# Patient Record
Sex: FEMALE | Race: WHITE
[De-identification: names, ages, dates, MRNs, and addresses within clinical notes are randomized per-mention and may not be internally consistent; named-entity substitution may affect disease eponyms.]

---

## 2018-02-04 ENCOUNTER — HOSPITAL ENCOUNTER (EMERGENCY)
Age: 78
Discharge: HOME | End: 2018-02-04
Payer: MEDICARE

## 2018-02-04 VITALS
OXYGEN SATURATION: 96 % | HEART RATE: 88 BPM | DIASTOLIC BLOOD PRESSURE: 82 MMHG | SYSTOLIC BLOOD PRESSURE: 162 MMHG | RESPIRATION RATE: 16 BRPM | TEMPERATURE: 98.2 F

## 2018-02-04 VITALS
RESPIRATION RATE: 24 BRPM | HEART RATE: 78 BPM | SYSTOLIC BLOOD PRESSURE: 152 MMHG | DIASTOLIC BLOOD PRESSURE: 71 MMHG | OXYGEN SATURATION: 93 %

## 2018-02-04 VITALS
OXYGEN SATURATION: 96 % | DIASTOLIC BLOOD PRESSURE: 73 MMHG | SYSTOLIC BLOOD PRESSURE: 138 MMHG | HEART RATE: 79 BPM | RESPIRATION RATE: 14 BRPM

## 2018-02-04 VITALS — BODY MASS INDEX: 34.6 KG/M2

## 2018-02-04 DIAGNOSIS — J44.9: ICD-10-CM

## 2018-02-04 DIAGNOSIS — Z72.0: ICD-10-CM

## 2018-02-04 DIAGNOSIS — J11.1: Primary | ICD-10-CM

## 2018-02-04 LAB
ANION GAP: 5 (ref 5–15)
BUN SERPL-MCNC: 12 MG/DL (ref 7–18)
BUN/CREAT RATIO: 20.2 RATIO (ref 10–20)
CALCIUM SERPL-MCNC: 8.7 MG/DL (ref 8.5–10.1)
CARBON DIOXIDE: 28 MMOL/L (ref 21–32)
CHLORIDE: 101 MMOL/L (ref 98–107)
DEPRECATED RDW RBC: 45.6 FL (ref 35.1–43.9)
DIFFERENTIAL INDICATED: (no result)
ERYTHROCYTE [DISTWIDTH] IN BLOOD: 13.5 % (ref 11.6–14.6)
EST GLOM FILT RATE - AFR AMER: 126 ML/MIN (ref 60–?)
ESTIMATED CREATININE CLEARANCE: 36.12 ML/MIN
GLUCOSE: 101 MG/DL (ref 74–106)
HCT VFR BLD AUTO: 37.6 % (ref 37–47)
HEMOGLOBIN: 12.9 G/DL (ref 12–15)
HGB BLD-MCNC: 12.9 G/DL (ref 12–15)
IMMATURE GRANULOCYTES COUNT: 0.03 X10^3/UL (ref 0–0)
MCV RBC: 95.4 FL (ref 81–99)
MEAN CORP HGB CONC: 34.3 G/GL (ref 32–36)
MEAN PLATELET VOL.: 10.8 FL (ref 6.2–12)
PLATELET # BLD: 147 K/MM3 (ref 150–450)
PLATELET COUNT: 147 K/MM3 (ref 150–450)
POSITIVE COUNT: NO
POSITIVE DIFFERENTIAL: NO
POSITIVE MORPHOLOGY: NO
POTASSIUM: 4.2 MMOL/L (ref 3.5–5.1)
RBC # BLD AUTO: 3.94 M/MM3 (ref 4.2–5.4)
RBC DISTRIBUTION WIDTH CV: 13.5 % (ref 11.6–14.6)
RBC DISTRIBUTION WIDTH SD: 45.6 FL (ref 35.1–43.9)
WBC # BLD AUTO: 7.3 K/MM3 (ref 4.4–11)
WHITE BLOOD COUNT: 7.3 K/MM3 (ref 4.4–11)

## 2018-02-04 PROCEDURE — 84484 ASSAY OF TROPONIN QUANT: CPT

## 2018-02-04 PROCEDURE — 71046 X-RAY EXAM CHEST 2 VIEWS: CPT

## 2018-02-04 PROCEDURE — 85025 COMPLETE CBC W/AUTO DIFF WBC: CPT

## 2018-02-04 PROCEDURE — 99284 EMERGENCY DEPT VISIT MOD MDM: CPT

## 2018-02-04 PROCEDURE — 87804 INFLUENZA ASSAY W/OPTIC: CPT

## 2018-02-04 PROCEDURE — 93005 ELECTROCARDIOGRAM TRACING: CPT

## 2018-02-04 PROCEDURE — 80048 BASIC METABOLIC PNL TOTAL CA: CPT

## 2018-02-04 PROCEDURE — A4216 STERILE WATER/SALINE, 10 ML: HCPCS

## 2018-02-19 ENCOUNTER — HOSPITAL ENCOUNTER (OUTPATIENT)
Age: 78
End: 2018-02-19
Payer: MEDICARE

## 2018-02-19 DIAGNOSIS — R05: Primary | ICD-10-CM

## 2018-02-19 PROCEDURE — 71046 X-RAY EXAM CHEST 2 VIEWS: CPT

## 2019-04-29 ENCOUNTER — HOSPITAL ENCOUNTER (OUTPATIENT)
Age: 79
End: 2019-04-29
Payer: MEDICARE

## 2019-04-29 VITALS — BODY MASS INDEX: 34.6 KG/M2

## 2019-04-29 DIAGNOSIS — I10: ICD-10-CM

## 2019-04-29 DIAGNOSIS — I25.10: ICD-10-CM

## 2019-04-29 DIAGNOSIS — M81.0: ICD-10-CM

## 2019-04-29 DIAGNOSIS — R10.9: Primary | ICD-10-CM

## 2019-04-29 LAB
ALANINE AMINOTRANSFER ALT/SGPT: 20 U/L (ref 13–56)
ALBUMIN SERPL-MCNC: 3.3 G/DL (ref 3.2–5)
ALKALINE PHOSPHATASE: 112 U/L (ref 45–117)
ANION GAP: 3 (ref 5–15)
AST(SGOT): 19 U/L (ref 15–37)
BUN SERPL-MCNC: 11 MG/DL (ref 7–18)
BUN/CREAT RATIO: 15.3 RATIO (ref 10–20)
CALCIUM SERPL-MCNC: 8.8 MG/DL (ref 8.5–10.1)
CARBON DIOXIDE: 28 MMOL/L (ref 21–32)
CHLORIDE: 105 MMOL/L (ref 98–107)
CHOLEST SERPL-MCNC: 112 MG/DL
DEPRECATED RDW RBC: 45.9 FL (ref 35.1–43.9)
ERYTHROCYTE [DISTWIDTH] IN BLOOD: 13.8 % (ref 11.6–14.6)
EST GLOM FILT RATE - AFR AMER: 101 ML/MIN (ref 60–?)
GLOBULIN: 3.5 G/DL (ref 2.2–4.2)
GLUCOSE: 98 MG/DL (ref 74–106)
HCT VFR BLD AUTO: 35.7 % (ref 37–47)
HEMOGLOBIN: 11.6 G/DL (ref 12–15)
HGB BLD-MCNC: 11.6 G/DL (ref 12–15)
MAGNESIUM: 1.8 MG/DL (ref 1.6–2.6)
MCV RBC: 92.2 FL (ref 81–99)
MEAN CORP HGB CONC: 32.5 G/GL (ref 32–36)
MEAN PLATELET VOL.: 11.1 FL (ref 6.2–12)
PLATELET # BLD: 287 K/MM3 (ref 150–450)
PLATELET COUNT: 287 K/MM3 (ref 150–450)
POTASSIUM: 4.1 MMOL/L (ref 3.5–5.1)
RBC # BLD AUTO: 3.87 M/MM3 (ref 4.2–5.4)
RBC DISTRIBUTION WIDTH CV: 13.8 % (ref 11.6–14.6)
RBC DISTRIBUTION WIDTH SD: 45.9 FL (ref 35.1–43.9)
SCAN INDICATED ON CBC? Y/N: NO
TRIGLYCERIDES: 115 MG/DL
VITAMIN D,25 HYDROXY: 45.8 NG/ML (ref 29.95–100.01)
VLDLC SERPL-MCNC: 23 MG/DL (ref 5–40)
WBC # BLD AUTO: 9.2 K/MM3 (ref 4.4–11)
WHITE BLOOD COUNT: 9.2 K/MM3 (ref 4.4–11)

## 2019-04-29 PROCEDURE — 80061 LIPID PANEL: CPT

## 2019-04-29 PROCEDURE — 80053 COMPREHEN METABOLIC PANEL: CPT

## 2019-04-29 PROCEDURE — 80048 BASIC METABOLIC PNL TOTAL CA: CPT

## 2019-04-29 PROCEDURE — 82306 VITAMIN D 25 HYDROXY: CPT

## 2019-04-29 PROCEDURE — 83735 ASSAY OF MAGNESIUM: CPT

## 2019-04-29 PROCEDURE — 85027 COMPLETE CBC AUTOMATED: CPT

## 2019-04-29 PROCEDURE — 84443 ASSAY THYROID STIM HORMONE: CPT

## 2019-04-29 PROCEDURE — 36415 COLL VENOUS BLD VENIPUNCTURE: CPT

## 2019-09-12 ENCOUNTER — HOSPITAL ENCOUNTER (OUTPATIENT)
Age: 79
End: 2019-09-12
Payer: MEDICARE

## 2019-09-12 VITALS — BODY MASS INDEX: 34.6 KG/M2

## 2019-09-12 DIAGNOSIS — J06.9: Primary | ICD-10-CM

## 2019-09-12 PROCEDURE — 71046 X-RAY EXAM CHEST 2 VIEWS: CPT

## 2019-09-24 ENCOUNTER — HOSPITAL ENCOUNTER (OUTPATIENT)
Age: 79
End: 2019-09-24
Payer: MEDICARE

## 2019-09-24 VITALS — BODY MASS INDEX: 34.6 KG/M2

## 2019-09-24 DIAGNOSIS — R91.8: Primary | ICD-10-CM

## 2019-09-24 PROCEDURE — 71250 CT THORAX DX C-: CPT

## 2019-09-30 ENCOUNTER — HOSPITAL ENCOUNTER (OUTPATIENT)
Age: 79
End: 2019-09-30
Payer: MEDICARE

## 2019-09-30 VITALS — BODY MASS INDEX: 34.6 KG/M2

## 2019-09-30 DIAGNOSIS — R91.1: Primary | ICD-10-CM

## 2019-09-30 LAB
ALANINE AMINOTRANSFER ALT/SGPT: 19 U/L (ref 13–56)
ALBUMIN SERPL-MCNC: 3.5 G/DL (ref 3.2–5)
ALKALINE PHOSPHATASE: 109 U/L (ref 45–117)
ANION GAP: 6 (ref 5–15)
AST(SGOT): 21 U/L (ref 15–37)
BUN SERPL-MCNC: 16 MG/DL (ref 7–18)
BUN/CREAT RATIO: 26.4 RATIO (ref 10–20)
CALCIUM SERPL-MCNC: 9 MG/DL (ref 8.5–10.1)
CARBON DIOXIDE: 27 MMOL/L (ref 21–32)
CHLORIDE: 103 MMOL/L (ref 98–107)
CRP SERPL-MCNC: < 2.9 MG/L (ref 0–3)
DEPRECATED RDW RBC: 50.4 FL (ref 35.1–43.9)
ERYTHROCYTE [DISTWIDTH] IN BLOOD: 15.5 % (ref 11.6–14.6)
EST GLOM FILT RATE - AFR AMER: 123 ML/MIN (ref 60–?)
GLOBULIN: 3.3 G/DL (ref 2.2–4.2)
GLUCOSE: 92 MG/DL (ref 74–106)
HCT VFR BLD AUTO: 35.3 % (ref 37–47)
HEMOGLOBIN: 11.4 G/DL (ref 12–15)
HGB BLD-MCNC: 11.4 G/DL (ref 12–15)
IMMATURE GRANULOCYTES COUNT: 0.04 X10^3/UL (ref 0–0)
LDH: 165 U/L (ref 84–246)
MCV RBC: 88.7 FL (ref 81–99)
MEAN CORP HGB CONC: 32.3 G/DL (ref 32–36)
MEAN PLATELET VOL.: 10.2 FL (ref 6.2–12)
NRBC FLAGGED BY ANALYZER: 0 % (ref 0–5)
PLATELET # BLD: 297 K/MM3 (ref 150–450)
PLATELET COUNT: 297 K/MM3 (ref 150–450)
POTASSIUM: 4.2 MMOL/L (ref 3.5–5.1)
RBC # BLD AUTO: 3.98 M/MM3 (ref 4.2–5.4)
RBC DISTRIBUTION WIDTH CV: 15.5 % (ref 11.6–14.6)
RBC DISTRIBUTION WIDTH SD: 50.4 FL (ref 35.1–43.9)
WBC # BLD AUTO: 8.4 K/MM3 (ref 4.4–11)
WHITE BLOOD COUNT: 8.4 K/MM3 (ref 4.4–11)

## 2019-09-30 PROCEDURE — 36415 COLL VENOUS BLD VENIPUNCTURE: CPT

## 2019-09-30 PROCEDURE — 83615 LACTATE (LD) (LDH) ENZYME: CPT

## 2019-09-30 PROCEDURE — 85025 COMPLETE CBC W/AUTO DIFF WBC: CPT

## 2019-09-30 PROCEDURE — 85652 RBC SED RATE AUTOMATED: CPT

## 2019-09-30 PROCEDURE — 80053 COMPREHEN METABOLIC PANEL: CPT

## 2019-09-30 PROCEDURE — 86140 C-REACTIVE PROTEIN: CPT

## 2019-10-11 ENCOUNTER — HOSPITAL ENCOUNTER (OUTPATIENT)
Age: 79
End: 2019-10-11
Payer: MEDICARE

## 2019-10-11 VITALS — BODY MASS INDEX: 34.6 KG/M2

## 2019-10-11 DIAGNOSIS — I25.10: ICD-10-CM

## 2019-10-11 DIAGNOSIS — R91.1: Primary | ICD-10-CM

## 2019-10-11 LAB
APTT PPP: 34.5 SECONDS (ref 24.1–36.2)
DEPRECATED RDW RBC: 51.4 FL (ref 35.1–43.9)
ERYTHROCYTE [DISTWIDTH] IN BLOOD: 15.7 % (ref 11.6–14.6)
HCT VFR BLD AUTO: 34.7 % (ref 37–47)
HEMOGLOBIN: 10.9 G/DL (ref 12–15)
HGB BLD-MCNC: 10.9 G/DL (ref 12–15)
MCV RBC: 89.9 FL (ref 81–99)
MEAN CORP HGB CONC: 31.4 G/DL (ref 32–36)
MEAN PLATELET VOL.: 10.5 FL (ref 6.2–12)
PLATELET # BLD: 277 K/MM3 (ref 150–450)
PLATELET COUNT: 277 K/MM3 (ref 150–450)
PROTHROMBIN TIME (PROTIME)PT.: 12.9 SECONDS (ref 11.7–14.9)
RBC # BLD AUTO: 3.86 M/MM3 (ref 4.2–5.4)
RBC DISTRIBUTION WIDTH CV: 15.7 % (ref 11.6–14.6)
RBC DISTRIBUTION WIDTH SD: 51.4 FL (ref 35.1–43.9)
WBC # BLD AUTO: 7.8 K/MM3 (ref 4.4–11)
WHITE BLOOD COUNT: 7.8 K/MM3 (ref 4.4–11)

## 2019-10-11 PROCEDURE — 85730 THROMBOPLASTIN TIME PARTIAL: CPT

## 2019-10-11 PROCEDURE — 36415 COLL VENOUS BLD VENIPUNCTURE: CPT

## 2019-10-11 PROCEDURE — 85610 PROTHROMBIN TIME: CPT

## 2019-10-11 PROCEDURE — 85027 COMPLETE CBC AUTOMATED: CPT

## 2019-10-15 ENCOUNTER — HOSPITAL ENCOUNTER (OUTPATIENT)
Dept: HOSPITAL 100 - CT | Age: 79
End: 2019-10-15
Payer: MEDICARE

## 2019-10-15 VITALS
HEART RATE: 76 BPM | OXYGEN SATURATION: 97 % | DIASTOLIC BLOOD PRESSURE: 83 MMHG | RESPIRATION RATE: 16 BRPM | SYSTOLIC BLOOD PRESSURE: 171 MMHG

## 2019-10-15 VITALS
HEART RATE: 78 BPM | OXYGEN SATURATION: 99 % | TEMPERATURE: 98.6 F | RESPIRATION RATE: 18 BRPM | DIASTOLIC BLOOD PRESSURE: 61 MMHG | SYSTOLIC BLOOD PRESSURE: 157 MMHG

## 2019-10-15 VITALS
HEART RATE: 84 BPM | OXYGEN SATURATION: 98 % | DIASTOLIC BLOOD PRESSURE: 94 MMHG | RESPIRATION RATE: 18 BRPM | SYSTOLIC BLOOD PRESSURE: 193 MMHG

## 2019-10-15 VITALS
SYSTOLIC BLOOD PRESSURE: 116 MMHG | DIASTOLIC BLOOD PRESSURE: 76 MMHG | RESPIRATION RATE: 14 BRPM | HEART RATE: 80 BPM | OXYGEN SATURATION: 97 %

## 2019-10-15 VITALS
HEART RATE: 82 BPM | RESPIRATION RATE: 16 BRPM | DIASTOLIC BLOOD PRESSURE: 95 MMHG | SYSTOLIC BLOOD PRESSURE: 184 MMHG | OXYGEN SATURATION: 97 %

## 2019-10-15 VITALS
OXYGEN SATURATION: 97 % | RESPIRATION RATE: 14 BRPM | SYSTOLIC BLOOD PRESSURE: 184 MMHG | DIASTOLIC BLOOD PRESSURE: 95 MMHG | HEART RATE: 84 BPM

## 2019-10-15 VITALS
OXYGEN SATURATION: 97 % | DIASTOLIC BLOOD PRESSURE: 87 MMHG | HEART RATE: 75 BPM | SYSTOLIC BLOOD PRESSURE: 150 MMHG | RESPIRATION RATE: 14 BRPM

## 2019-10-15 VITALS
HEART RATE: 85 BPM | SYSTOLIC BLOOD PRESSURE: 119 MMHG | DIASTOLIC BLOOD PRESSURE: 43 MMHG | RESPIRATION RATE: 16 BRPM | OXYGEN SATURATION: 96 %

## 2019-10-15 VITALS
HEART RATE: 80 BPM | DIASTOLIC BLOOD PRESSURE: 105 MMHG | RESPIRATION RATE: 17 BRPM | SYSTOLIC BLOOD PRESSURE: 169 MMHG | OXYGEN SATURATION: 98 %

## 2019-10-15 VITALS
SYSTOLIC BLOOD PRESSURE: 169 MMHG | HEART RATE: 75 BPM | DIASTOLIC BLOOD PRESSURE: 60 MMHG | OXYGEN SATURATION: 95 % | RESPIRATION RATE: 20 BRPM

## 2019-10-15 VITALS
DIASTOLIC BLOOD PRESSURE: 61 MMHG | RESPIRATION RATE: 20 BRPM | OXYGEN SATURATION: 96 % | HEART RATE: 72 BPM | SYSTOLIC BLOOD PRESSURE: 160 MMHG

## 2019-10-15 VITALS — BODY MASS INDEX: 34.6 KG/M2 | BODY MASS INDEX: 33.8 KG/M2

## 2019-10-15 DIAGNOSIS — I25.10: ICD-10-CM

## 2019-10-15 DIAGNOSIS — E78.5: ICD-10-CM

## 2019-10-15 DIAGNOSIS — C34.12: Primary | ICD-10-CM

## 2019-10-15 DIAGNOSIS — F32.9: ICD-10-CM

## 2019-10-15 DIAGNOSIS — F17.210: ICD-10-CM

## 2019-10-15 DIAGNOSIS — R91.1: ICD-10-CM

## 2019-10-15 DIAGNOSIS — I10: ICD-10-CM

## 2019-10-15 PROCEDURE — 71046 X-RAY EXAM CHEST 2 VIEWS: CPT

## 2019-10-15 PROCEDURE — 88342 IMHCHEM/IMCYTCHM 1ST ANTB: CPT

## 2019-10-15 PROCEDURE — 99156 MOD SED OTH PHYS/QHP 5/>YRS: CPT

## 2019-10-15 PROCEDURE — 32405: CPT

## 2019-10-15 PROCEDURE — 88341 IMHCHEM/IMCYTCHM EA ADD ANTB: CPT

## 2019-10-15 PROCEDURE — 77012 CT SCAN FOR NEEDLE BIOPSY: CPT

## 2019-10-15 PROCEDURE — 88172 CYTP DX EVAL FNA 1ST EA SITE: CPT

## 2019-10-15 PROCEDURE — A4216 STERILE WATER/SALINE, 10 ML: HCPCS

## 2019-10-15 PROCEDURE — 88305 TISSUE EXAM BY PATHOLOGIST: CPT

## 2019-10-15 PROCEDURE — 99157 MOD SED OTHER PHYS/QHP EA: CPT

## 2019-10-15 PROCEDURE — 88313 SPECIAL STAINS GROUP 2: CPT

## 2019-10-15 RX ADMIN — FENTANYL CITRATE 0 MCG: 50 INJECTION, SOLUTION INTRAMUSCULAR; INTRAVENOUS at 11:00

## 2019-10-15 RX ADMIN — MIDAZOLAM 0 MG: 1 INJECTION, SOLUTION INTRAMUSCULAR; INTRAVENOUS at 11:00

## 2019-10-21 ENCOUNTER — HOSPITAL ENCOUNTER (OUTPATIENT)
Dept: HOSPITAL 100 - CT | Age: 79
End: 2019-10-21
Payer: MEDICARE

## 2019-10-21 VITALS — BODY MASS INDEX: 33.8 KG/M2

## 2019-10-21 DIAGNOSIS — C34.90: Primary | ICD-10-CM

## 2019-10-21 PROCEDURE — 74160 CT ABDOMEN W/CONTRAST: CPT

## 2019-11-12 ENCOUNTER — HOSPITAL ENCOUNTER (INPATIENT)
Dept: HOSPITAL 100 - ED | Age: 79
LOS: 1 days | Discharge: ANOTHER HOSPITAL | DRG: 64 | End: 2019-11-13
Payer: MEDICARE

## 2019-11-12 VITALS
RESPIRATION RATE: 20 BRPM | DIASTOLIC BLOOD PRESSURE: 67 MMHG | HEART RATE: 63 BPM | OXYGEN SATURATION: 95 % | TEMPERATURE: 97.34 F | SYSTOLIC BLOOD PRESSURE: 132 MMHG

## 2019-11-12 VITALS
HEART RATE: 83 BPM | DIASTOLIC BLOOD PRESSURE: 81 MMHG | SYSTOLIC BLOOD PRESSURE: 161 MMHG | RESPIRATION RATE: 15 BRPM | OXYGEN SATURATION: 94 % | TEMPERATURE: 97.52 F

## 2019-11-12 VITALS — TEMPERATURE: 97.88 F

## 2019-11-12 VITALS
OXYGEN SATURATION: 95 % | DIASTOLIC BLOOD PRESSURE: 60 MMHG | RESPIRATION RATE: 16 BRPM | HEART RATE: 92 BPM | SYSTOLIC BLOOD PRESSURE: 148 MMHG | TEMPERATURE: 97.8 F

## 2019-11-12 VITALS
RESPIRATION RATE: 16 BRPM | HEART RATE: 79 BPM | OXYGEN SATURATION: 95 % | SYSTOLIC BLOOD PRESSURE: 142 MMHG | DIASTOLIC BLOOD PRESSURE: 71 MMHG | TEMPERATURE: 98.42 F

## 2019-11-12 VITALS
OXYGEN SATURATION: 94 % | HEART RATE: 75 BPM | SYSTOLIC BLOOD PRESSURE: 139 MMHG | TEMPERATURE: 98.5 F | RESPIRATION RATE: 16 BRPM | DIASTOLIC BLOOD PRESSURE: 68 MMHG

## 2019-11-12 VITALS — BODY MASS INDEX: 33.7 KG/M2 | BODY MASS INDEX: 33 KG/M2 | BODY MASS INDEX: 35.4 KG/M2

## 2019-11-12 VITALS — HEART RATE: 75 BPM

## 2019-11-12 VITALS — HEART RATE: 92 BPM

## 2019-11-12 VITALS — OXYGEN SATURATION: 93 %

## 2019-11-12 VITALS — HEART RATE: 80 BPM

## 2019-11-12 DIAGNOSIS — Z79.82: ICD-10-CM

## 2019-11-12 DIAGNOSIS — E78.5: ICD-10-CM

## 2019-11-12 DIAGNOSIS — F32.9: ICD-10-CM

## 2019-11-12 DIAGNOSIS — I10: ICD-10-CM

## 2019-11-12 DIAGNOSIS — Z82.49: ICD-10-CM

## 2019-11-12 DIAGNOSIS — D64.9: ICD-10-CM

## 2019-11-12 DIAGNOSIS — I25.10: ICD-10-CM

## 2019-11-12 DIAGNOSIS — Z79.899: ICD-10-CM

## 2019-11-12 DIAGNOSIS — C34.12: ICD-10-CM

## 2019-11-12 DIAGNOSIS — I65.23: ICD-10-CM

## 2019-11-12 DIAGNOSIS — Z80.1: ICD-10-CM

## 2019-11-12 DIAGNOSIS — K27.4: ICD-10-CM

## 2019-11-12 DIAGNOSIS — F17.210: ICD-10-CM

## 2019-11-12 DIAGNOSIS — I63.9: Primary | ICD-10-CM

## 2019-11-12 DIAGNOSIS — K21.9: ICD-10-CM

## 2019-11-12 LAB
ANION GAP: 9 (ref 5–15)
APTT PPP: 28.3 SECONDS (ref 24.1–36.2)
BUN SERPL-MCNC: 13 MG/DL (ref 7–18)
BUN/CREAT RATIO: 22.3 RATIO (ref 10–20)
CALCIUM SERPL-MCNC: 8.9 MG/DL (ref 8.5–10.1)
CARBON DIOXIDE: 24 MMOL/L (ref 21–32)
CHLORIDE: 102 MMOL/L (ref 98–107)
DEPRECATED RDW RBC: 50.6 FL (ref 35.1–43.9)
ERYTHROCYTE [DISTWIDTH] IN BLOOD: 15.4 % (ref 11.6–14.6)
EST GLOM FILT RATE - AFR AMER: 129 ML/MIN (ref 60–?)
ESTIMATED CREATININE CLEARANCE: 36.67 ML/MIN
GLUCOSE: 153 MG/DL (ref 74–106)
HCT VFR BLD AUTO: 28.1 % (ref 37–47)
HEMOGLOBIN: 8.8 G/DL (ref 12–15)
HGB BLD-MCNC: 8.8 G/DL (ref 12–15)
IMMATURE GRANULOCYTES COUNT: 0.06 X10^3/UL (ref 0–0)
MCV RBC: 88.6 FL (ref 81–99)
MEAN CORP HGB CONC: 31.3 G/DL (ref 32–36)
MEAN PLATELET VOL.: 9.6 FL (ref 6.2–12)
NRBC FLAGGED BY ANALYZER: 0 % (ref 0–5)
PLATELET # BLD: 297 K/MM3 (ref 150–450)
PLATELET COUNT: 297 K/MM3 (ref 150–450)
POTASSIUM: 3.7 MMOL/L (ref 3.5–5.1)
PROTHROMBIN TIME (PROTIME)PT.: 13.4 SECONDS (ref 11.7–14.9)
RBC # BLD AUTO: 3.17 M/MM3 (ref 4.2–5.4)
RBC DISTRIBUTION WIDTH CV: 15.4 % (ref 11.6–14.6)
RBC DISTRIBUTION WIDTH SD: 50.6 FL (ref 35.1–43.9)
WBC # BLD AUTO: 10.8 K/MM3 (ref 4.4–11)
WHITE BLOOD COUNT: 10.8 K/MM3 (ref 4.4–11)

## 2019-11-12 PROCEDURE — 80048 BASIC METABOLIC PNL TOTAL CA: CPT

## 2019-11-12 PROCEDURE — 82728 ASSAY OF FERRITIN: CPT

## 2019-11-12 PROCEDURE — 83036 HEMOGLOBIN GLYCOSYLATED A1C: CPT

## 2019-11-12 PROCEDURE — 82607 VITAMIN B-12: CPT

## 2019-11-12 PROCEDURE — 93005 ELECTROCARDIOGRAM TRACING: CPT

## 2019-11-12 PROCEDURE — A9575 INJ GADOTERATE MEGLUMI 0.1ML: HCPCS

## 2019-11-12 PROCEDURE — A4216 STERILE WATER/SALINE, 10 ML: HCPCS

## 2019-11-12 PROCEDURE — 85025 COMPLETE CBC W/AUTO DIFF WBC: CPT

## 2019-11-12 PROCEDURE — 85730 THROMBOPLASTIN TIME PARTIAL: CPT

## 2019-11-12 PROCEDURE — 84484 ASSAY OF TROPONIN QUANT: CPT

## 2019-11-12 PROCEDURE — 85610 PROTHROMBIN TIME: CPT

## 2019-11-12 PROCEDURE — 99406 BEHAV CHNG SMOKING 3-10 MIN: CPT

## 2019-11-12 PROCEDURE — 93880 EXTRACRANIAL BILAT STUDY: CPT

## 2019-11-12 PROCEDURE — 70553 MRI BRAIN STEM W/O & W/DYE: CPT

## 2019-11-12 PROCEDURE — 70498 CT ANGIOGRAPHY NECK: CPT

## 2019-11-12 PROCEDURE — 80053 COMPREHEN METABOLIC PANEL: CPT

## 2019-11-12 PROCEDURE — 70450 CT HEAD/BRAIN W/O DYE: CPT

## 2019-11-12 PROCEDURE — 36415 COLL VENOUS BLD VENIPUNCTURE: CPT

## 2019-11-12 PROCEDURE — 93306 TTE W/DOPPLER COMPLETE: CPT

## 2019-11-12 PROCEDURE — 70496 CT ANGIOGRAPHY HEAD: CPT

## 2019-11-12 PROCEDURE — 83615 LACTATE (LD) (LDH) ENZYME: CPT

## 2019-11-12 PROCEDURE — 82746 ASSAY OF FOLIC ACID SERUM: CPT

## 2019-11-12 PROCEDURE — 99285 EMERGENCY DEPT VISIT HI MDM: CPT

## 2019-11-12 PROCEDURE — 71045 X-RAY EXAM CHEST 1 VIEW: CPT

## 2019-11-12 PROCEDURE — 83540 ASSAY OF IRON: CPT

## 2019-11-12 PROCEDURE — 83550 IRON BINDING TEST: CPT

## 2019-11-12 RX ADMIN — SODIUM CHLORIDE, PRESERVATIVE FREE 0 ML: 5 INJECTION INTRAVENOUS at 22:18

## 2019-11-13 VITALS
RESPIRATION RATE: 18 BRPM | HEART RATE: 73 BPM | TEMPERATURE: 98.42 F | OXYGEN SATURATION: 96 % | SYSTOLIC BLOOD PRESSURE: 117 MMHG | DIASTOLIC BLOOD PRESSURE: 63 MMHG

## 2019-11-13 VITALS
DIASTOLIC BLOOD PRESSURE: 69 MMHG | RESPIRATION RATE: 17 BRPM | HEART RATE: 69 BPM | OXYGEN SATURATION: 96 % | SYSTOLIC BLOOD PRESSURE: 173 MMHG | TEMPERATURE: 98.24 F

## 2019-11-13 VITALS
OXYGEN SATURATION: 94 % | RESPIRATION RATE: 16 BRPM | DIASTOLIC BLOOD PRESSURE: 52 MMHG | TEMPERATURE: 98.06 F | SYSTOLIC BLOOD PRESSURE: 124 MMHG | HEART RATE: 68 BPM

## 2019-11-13 VITALS
DIASTOLIC BLOOD PRESSURE: 62 MMHG | OXYGEN SATURATION: 93 % | HEART RATE: 71 BPM | TEMPERATURE: 98.5 F | RESPIRATION RATE: 17 BRPM | SYSTOLIC BLOOD PRESSURE: 139 MMHG

## 2019-11-13 VITALS — HEART RATE: 81 BPM

## 2019-11-13 VITALS — HEART RATE: 82 BPM

## 2019-11-13 VITALS — HEART RATE: 72 BPM

## 2019-11-13 VITALS — HEART RATE: 67 BPM

## 2019-11-13 RX ADMIN — SODIUM CHLORIDE, PRESERVATIVE FREE 0 ML: 5 INJECTION INTRAVENOUS at 09:14

## 2020-02-10 ENCOUNTER — HOSPITAL ENCOUNTER (OUTPATIENT)
Dept: HOSPITAL 100 - CT | Age: 80
End: 2020-02-10
Payer: MEDICARE

## 2020-02-10 VITALS — BODY MASS INDEX: 33.7 KG/M2 | BODY MASS INDEX: 33.1 KG/M2

## 2020-02-10 DIAGNOSIS — C34.90: Primary | ICD-10-CM

## 2020-02-10 LAB — CREATININE FINGERSTICK: 0.7 MG/DL (ref 0.55–1.02)

## 2020-02-10 PROCEDURE — 71260 CT THORAX DX C+: CPT

## 2020-02-10 PROCEDURE — 74160 CT ABDOMEN W/CONTRAST: CPT

## 2020-05-13 ENCOUNTER — HOSPITAL ENCOUNTER (OUTPATIENT)
Age: 80
End: 2020-05-13
Payer: MEDICARE

## 2020-05-13 VITALS — BODY MASS INDEX: 33.1 KG/M2 | BODY MASS INDEX: 33.7 KG/M2

## 2020-05-13 DIAGNOSIS — K92.2: Primary | ICD-10-CM

## 2020-05-13 DIAGNOSIS — M81.0: ICD-10-CM

## 2020-05-13 LAB
ALANINE AMINOTRANSFER ALT/SGPT: 23 U/L (ref 13–56)
ALBUMIN SERPL-MCNC: 3.2 G/DL (ref 3.2–5)
ALKALINE PHOSPHATASE: 118 U/L (ref 45–117)
ANION GAP: 7 (ref 5–15)
AST(SGOT): 19 U/L (ref 15–37)
BUN SERPL-MCNC: 23 MG/DL (ref 7–18)
BUN/CREAT RATIO: 40.2 RATIO (ref 10–20)
CALCIUM SERPL-MCNC: 9 MG/DL (ref 8.5–10.1)
CARBON DIOXIDE: 27 MMOL/L (ref 21–32)
CHLORIDE: 105 MMOL/L (ref 98–107)
DEPRECATED RDW RBC: 57.2 FL (ref 35.1–43.9)
ERYTHROCYTE [DISTWIDTH] IN BLOOD: 18.7 % (ref 11.6–14.6)
EST GLOM FILT RATE - AFR AMER: 131 ML/MIN (ref 60–?)
FERRITIN SERPL-MCNC: 5 NG/ML (ref 8–252)
GLOBULIN: 3.5 G/DL (ref 2.2–4.2)
GLUCOSE: 91 MG/DL (ref 74–106)
HCT VFR BLD AUTO: 33 % (ref 37–47)
HEMOGLOBIN: 9.9 G/DL (ref 12–15)
HGB BLD-MCNC: 9.9 G/DL (ref 12–15)
IMMATURE GRANULOCYTES COUNT: 0.03 X10^3/UL (ref 0–0)
IMMATURE RETICULOCYTE FRACTION: 20 % (ref 3–15.9)
IRON SPEC-MCNT: 19 UG/DL (ref 50–170)
MCV RBC: 83.5 FL (ref 81–99)
MEAN CORP HGB CONC: 30 G/DL (ref 32–36)
MEAN PLATELET VOL.: 10.7 FL (ref 6.2–12)
NRBC FLAGGED BY ANALYZER: 0 % (ref 0–5)
PLATELET # BLD: 274 K/MM3 (ref 150–450)
PLATELET COUNT: 274 K/MM3 (ref 150–450)
POTASSIUM: 3.9 MMOL/L (ref 3.5–5.1)
RBC # BLD AUTO: 3.95 M/MM3 (ref 4.2–5.4)
RBC DISTRIBUTION WIDTH CV: 18.7 % (ref 11.6–14.6)
RBC DISTRIBUTION WIDTH SD: 57.2 FL (ref 35.1–43.9)
RETICS # AUTO: 1.33 % (ref 0.5–1.5)
VITAMIN D,25 HYDROXY: 43.8 NG/ML
WBC # BLD AUTO: 7.6 K/MM3 (ref 4.4–11)
WHITE BLOOD COUNT: 7.6 K/MM3 (ref 4.4–11)

## 2020-05-13 PROCEDURE — 82728 ASSAY OF FERRITIN: CPT

## 2020-05-13 PROCEDURE — 83540 ASSAY OF IRON: CPT

## 2020-05-13 PROCEDURE — 85045 AUTOMATED RETICULOCYTE COUNT: CPT

## 2020-05-13 PROCEDURE — 36415 COLL VENOUS BLD VENIPUNCTURE: CPT

## 2020-05-13 PROCEDURE — 80053 COMPREHEN METABOLIC PANEL: CPT

## 2020-05-13 PROCEDURE — 85025 COMPLETE CBC W/AUTO DIFF WBC: CPT

## 2020-05-13 PROCEDURE — 84443 ASSAY THYROID STIM HORMONE: CPT

## 2020-05-13 PROCEDURE — 82306 VITAMIN D 25 HYDROXY: CPT

## 2020-05-27 ENCOUNTER — HOSPITAL ENCOUNTER (OUTPATIENT)
Age: 80
End: 2020-05-27
Payer: MEDICARE

## 2020-05-27 VITALS — BODY MASS INDEX: 33.7 KG/M2 | BODY MASS INDEX: 33.1 KG/M2

## 2020-05-27 DIAGNOSIS — R10.10: Primary | ICD-10-CM

## 2020-05-27 PROCEDURE — 74019 RADEX ABDOMEN 2 VIEWS: CPT

## 2020-06-24 ENCOUNTER — HOSPITAL ENCOUNTER (OUTPATIENT)
Dept: HOSPITAL 100 - CT | Age: 80
End: 2020-06-24
Payer: MEDICARE

## 2020-06-24 VITALS — BODY MASS INDEX: 33.7 KG/M2 | BODY MASS INDEX: 33.1 KG/M2

## 2020-06-24 DIAGNOSIS — C34.92: Primary | ICD-10-CM

## 2020-06-24 LAB — CREATININE FINGERSTICK: < 0.6 MG/DL (ref 0.55–1.02)

## 2020-06-24 PROCEDURE — 71260 CT THORAX DX C+: CPT

## 2020-10-29 ENCOUNTER — HOSPITAL ENCOUNTER (OUTPATIENT)
Age: 80
End: 2020-10-29
Payer: MEDICARE

## 2020-10-29 VITALS — BODY MASS INDEX: 33.7 KG/M2 | BODY MASS INDEX: 33.1 KG/M2

## 2020-10-29 DIAGNOSIS — F17.210: ICD-10-CM

## 2020-10-29 DIAGNOSIS — C34.92: Primary | ICD-10-CM

## 2020-10-29 DIAGNOSIS — I10: ICD-10-CM

## 2020-10-29 PROCEDURE — 71250 CT THORAX DX C-: CPT

## 2021-02-03 ENCOUNTER — HOSPITAL ENCOUNTER (OUTPATIENT)
Age: 81
End: 2021-02-03
Payer: MEDICARE

## 2021-02-03 VITALS — BODY MASS INDEX: 33.1 KG/M2 | BODY MASS INDEX: 33.7 KG/M2

## 2021-02-03 DIAGNOSIS — R07.9: Primary | ICD-10-CM

## 2021-02-03 LAB
ALANINE AMINOTRANSFER ALT/SGPT: 19 U/L (ref 13–56)
ALBUMIN SERPL-MCNC: 3.6 G/DL (ref 3.2–5)
ALKALINE PHOSPHATASE: 107 U/L (ref 45–117)
ANION GAP: 4 (ref 5–15)
AST(SGOT): 17 U/L (ref 15–37)
BUN SERPL-MCNC: 21 MG/DL (ref 7–18)
BUN/CREAT RATIO: 32.8 RATIO (ref 10–20)
CALCIUM SERPL-MCNC: 9.2 MG/DL (ref 8.5–10.1)
CARBON DIOXIDE: 31 MMOL/L (ref 21–32)
CHLORIDE: 102 MMOL/L (ref 98–107)
CHOLEST SERPL-MCNC: 139 MG/DL
DEPRECATED RDW RBC: 48.2 FL (ref 35.1–43.9)
ERYTHROCYTE [DISTWIDTH] IN BLOOD: 13.3 % (ref 11.6–14.6)
EST GLOM FILT RATE - AFR AMER: 115 ML/MIN (ref 60–?)
GLOBULIN: 3.7 G/DL (ref 2.2–4.2)
GLUCOSE: 92 MG/DL (ref 74–106)
HCT VFR BLD AUTO: 44.6 % (ref 37–47)
HEMOGLOBIN: 13.8 G/DL (ref 12–15)
HGB BLD-MCNC: 13.8 G/DL (ref 12–15)
MCV RBC: 97.8 FL (ref 81–99)
MEAN CORP HGB CONC: 30.9 G/DL (ref 32–36)
MEAN PLATELET VOL.: 10.6 FL (ref 6.2–12)
PLATELET # BLD: 239 K/MM3 (ref 150–450)
PLATELET COUNT: 239 K/MM3 (ref 150–450)
POTASSIUM: 3.7 MMOL/L (ref 3.5–5.1)
RBC # BLD AUTO: 4.56 M/MM3 (ref 4.2–5.4)
RBC DISTRIBUTION WIDTH CV: 13.3 % (ref 11.6–14.6)
RBC DISTRIBUTION WIDTH SD: 48.2 FL (ref 35.1–43.9)
TRIGLYCERIDES: 95 MG/DL
VLDLC SERPL-MCNC: 19 MG/DL (ref 5–40)
WBC # BLD AUTO: 7.2 K/MM3 (ref 4.4–11)
WHITE BLOOD COUNT: 7.2 K/MM3 (ref 4.4–11)

## 2021-02-03 PROCEDURE — 85027 COMPLETE CBC AUTOMATED: CPT

## 2021-02-03 PROCEDURE — 84443 ASSAY THYROID STIM HORMONE: CPT

## 2021-02-03 PROCEDURE — 71046 X-RAY EXAM CHEST 2 VIEWS: CPT

## 2021-02-03 PROCEDURE — 80061 LIPID PANEL: CPT

## 2021-02-03 PROCEDURE — 36415 COLL VENOUS BLD VENIPUNCTURE: CPT

## 2021-02-03 PROCEDURE — 80053 COMPREHEN METABOLIC PANEL: CPT

## 2021-03-25 ENCOUNTER — HOSPITAL ENCOUNTER (OUTPATIENT)
Age: 81
End: 2021-03-25
Payer: MEDICARE

## 2021-03-25 VITALS — BODY MASS INDEX: 33.1 KG/M2 | BODY MASS INDEX: 33.7 KG/M2

## 2021-03-25 DIAGNOSIS — C34.92: Primary | ICD-10-CM

## 2021-03-25 PROCEDURE — 71250 CT THORAX DX C-: CPT

## 2021-05-05 ENCOUNTER — HOSPITAL ENCOUNTER (OUTPATIENT)
Age: 81
End: 2021-05-05
Payer: MEDICARE

## 2021-05-05 VITALS — BODY MASS INDEX: 33.7 KG/M2 | BODY MASS INDEX: 33.1 KG/M2

## 2021-05-05 DIAGNOSIS — I10: Primary | ICD-10-CM

## 2021-05-05 DIAGNOSIS — E55.9: ICD-10-CM

## 2021-05-05 LAB
ANION GAP: 5 (ref 5–15)
BUN SERPL-MCNC: 21 MG/DL (ref 7–18)
BUN/CREAT RATIO: 36.7 RATIO (ref 10–20)
CALCIUM SERPL-MCNC: 9 MG/DL (ref 8.5–10.1)
CARBON DIOXIDE: 33 MMOL/L (ref 21–32)
CHLORIDE: 97 MMOL/L (ref 98–107)
EST GLOM FILT RATE - AFR AMER: 131 ML/MIN (ref 60–?)
GLUCOSE: 95 MG/DL (ref 74–106)
POTASSIUM: 3.6 MMOL/L (ref 3.5–5.1)
VITAMIN D,25 HYDROXY: 48.4 NG/ML

## 2021-05-05 PROCEDURE — 82306 VITAMIN D 25 HYDROXY: CPT

## 2021-05-05 PROCEDURE — 36415 COLL VENOUS BLD VENIPUNCTURE: CPT

## 2021-05-05 PROCEDURE — 80048 BASIC METABOLIC PNL TOTAL CA: CPT

## 2021-06-16 ENCOUNTER — HOSPITAL ENCOUNTER (OUTPATIENT)
Age: 81
End: 2021-06-16
Payer: MEDICARE

## 2021-06-16 VITALS — BODY MASS INDEX: 33.7 KG/M2 | BODY MASS INDEX: 33.1 KG/M2

## 2021-06-16 DIAGNOSIS — M54.9: Primary | ICD-10-CM

## 2021-06-16 PROCEDURE — 72070 X-RAY EXAM THORAC SPINE 2VWS: CPT

## 2021-06-16 PROCEDURE — 72110 X-RAY EXAM L-2 SPINE 4/>VWS: CPT

## 2021-06-25 ENCOUNTER — HOSPITAL ENCOUNTER (EMERGENCY)
Age: 81
Discharge: HOME | End: 2021-06-25
Payer: MEDICARE

## 2021-06-25 VITALS
RESPIRATION RATE: 15 BRPM | SYSTOLIC BLOOD PRESSURE: 154 MMHG | OXYGEN SATURATION: 97 % | HEART RATE: 62 BPM | DIASTOLIC BLOOD PRESSURE: 75 MMHG | TEMPERATURE: 97.16 F

## 2021-06-25 VITALS — BODY MASS INDEX: 31.1 KG/M2 | BODY MASS INDEX: 33.7 KG/M2 | BODY MASS INDEX: 33.1 KG/M2
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
## 2021-06-25 DIAGNOSIS — I10: ICD-10-CM

## 2021-06-25 DIAGNOSIS — I25.10: ICD-10-CM

## 2021-06-25 DIAGNOSIS — Z79.82: ICD-10-CM

## 2021-06-25 DIAGNOSIS — E78.5: ICD-10-CM

## 2021-06-25 DIAGNOSIS — F17.210: ICD-10-CM
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
## 2021-06-25 DIAGNOSIS — S93.401A: Primary | ICD-10-CM

## 2021-06-25 DIAGNOSIS — F32.9: ICD-10-CM

## 2021-06-25 DIAGNOSIS — Z79.899: ICD-10-CM
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
## 2021-06-25 PROCEDURE — 73610 X-RAY EXAM OF ANKLE: CPT

## 2021-06-25 PROCEDURE — 99282 EMERGENCY DEPT VISIT SF MDM: CPT

## 2021-06-25 PROCEDURE — A4216 STERILE WATER/SALINE, 10 ML: HCPCS

## 2021-08-19 ENCOUNTER — HOSPITAL ENCOUNTER (OUTPATIENT)
Dept: HOSPITAL 100 - MTRAD | Age: 81
End: 2021-08-19
Payer: MEDICARE

## 2021-08-19 VITALS — BODY MASS INDEX: 33.1 KG/M2

## 2021-08-19 DIAGNOSIS — R07.81: Primary | ICD-10-CM

## 2021-08-19 PROCEDURE — 71100 X-RAY EXAM RIBS UNI 2 VIEWS: CPT

## 2021-09-15 ENCOUNTER — HOSPITAL ENCOUNTER (OUTPATIENT)
Dept: HOSPITAL 100 - CT | Age: 81
End: 2021-09-15
Payer: MEDICARE

## 2021-09-15 VITALS — BODY MASS INDEX: 33.7 KG/M2 | BODY MASS INDEX: 33.1 KG/M2

## 2021-09-15 DIAGNOSIS — Z85.118: Primary | ICD-10-CM

## 2021-09-15 PROCEDURE — 71250 CT THORAX DX C-: CPT

## 2021-10-01 ENCOUNTER — HOSPITAL ENCOUNTER (EMERGENCY)
Age: 81
Discharge: HOME | End: 2021-10-01
Payer: MEDICARE

## 2021-10-01 VITALS — BODY MASS INDEX: 33.1 KG/M2 | BODY MASS INDEX: 34.7 KG/M2

## 2021-10-01 VITALS
TEMPERATURE: 96.98 F | RESPIRATION RATE: 16 BRPM | DIASTOLIC BLOOD PRESSURE: 68 MMHG | SYSTOLIC BLOOD PRESSURE: 141 MMHG | HEART RATE: 67 BPM | OXYGEN SATURATION: 97 %

## 2021-10-01 VITALS — DIASTOLIC BLOOD PRESSURE: 81 MMHG | RESPIRATION RATE: 16 BRPM | HEART RATE: 77 BPM | SYSTOLIC BLOOD PRESSURE: 151 MMHG

## 2021-10-01 DIAGNOSIS — F32.9: ICD-10-CM

## 2021-10-01 DIAGNOSIS — I25.10: ICD-10-CM

## 2021-10-01 DIAGNOSIS — M71.9: Primary | ICD-10-CM

## 2021-10-01 DIAGNOSIS — Z79.82: ICD-10-CM

## 2021-10-01 DIAGNOSIS — I10: ICD-10-CM

## 2021-10-01 DIAGNOSIS — F17.210: ICD-10-CM

## 2021-10-01 DIAGNOSIS — E78.5: ICD-10-CM

## 2021-10-01 DIAGNOSIS — Z79.52: ICD-10-CM

## 2021-10-01 PROCEDURE — 73700 CT LOWER EXTREMITY W/O DYE: CPT

## 2021-10-01 PROCEDURE — 73502 X-RAY EXAM HIP UNI 2-3 VIEWS: CPT

## 2021-10-01 PROCEDURE — 99283 EMERGENCY DEPT VISIT LOW MDM: CPT

## 2021-11-11 ENCOUNTER — HOSPITAL ENCOUNTER (OUTPATIENT)
Age: 81
End: 2021-11-11
Payer: MEDICARE

## 2021-11-11 VITALS — BODY MASS INDEX: 33.1 KG/M2

## 2021-11-11 DIAGNOSIS — R10.9: Primary | ICD-10-CM

## 2021-11-11 LAB
ALANINE AMINOTRANSFER ALT/SGPT: 14 U/L (ref 13–56)
ALBUMIN SERPL-MCNC: 2.7 G/DL (ref 3.2–5)
ALKALINE PHOSPHATASE: 188 U/L (ref 45–117)
ANION GAP: 7 (ref 5–15)
AST(SGOT): 15 U/L (ref 15–37)
BUN SERPL-MCNC: 29 MG/DL (ref 7–18)
BUN/CREAT RATIO: 26.4 RATIO (ref 10–20)
CALCIUM SERPL-MCNC: 8.5 MG/DL (ref 8.5–10.1)
CARBON DIOXIDE: 31 MMOL/L (ref 21–32)
CHLORIDE: 97 MMOL/L (ref 98–107)
DEPRECATED RDW RBC: 57.1 FL (ref 35.1–43.9)
ERYTHROCYTE [DISTWIDTH] IN BLOOD: 15.9 % (ref 11.6–14.6)
EST GLOM FILT RATE - AFR AMER: 62 ML/MIN (ref 60–?)
GLOBULIN: 3.8 G/DL (ref 2.2–4.2)
GLUCOSE: 99 MG/DL (ref 74–106)
HCT VFR BLD AUTO: 36.4 % (ref 37–47)
HEMOGLOBIN: 12.1 G/DL (ref 12–15)
HGB BLD-MCNC: 12.1 G/DL (ref 12–15)
IMMATURE GRANULOCYTES COUNT: 0.08 X10^3/UL (ref 0–0)
MCV RBC: 98.1 FL (ref 81–99)
MEAN CORP HGB CONC: 33.2 G/DL (ref 32–36)
MEAN PLATELET VOL.: 9.4 FL (ref 6.2–12)
NRBC FLAGGED BY ANALYZER: 0 % (ref 0–5)
PLATELET # BLD: 409 K/MM3 (ref 150–450)
PLATELET COUNT: 409 K/MM3 (ref 150–450)
POTASSIUM: 3.3 MMOL/L (ref 3.5–5.1)
RBC # BLD AUTO: 3.71 M/MM3 (ref 4.2–5.4)
RBC DISTRIBUTION WIDTH CV: 15.9 % (ref 11.6–14.6)
RBC DISTRIBUTION WIDTH SD: 57.1 FL (ref 35.1–43.9)
WBC # BLD AUTO: 9.1 K/MM3 (ref 4.4–11)
WHITE BLOOD COUNT: 9.1 K/MM3 (ref 4.4–11)

## 2021-11-11 PROCEDURE — 85652 RBC SED RATE AUTOMATED: CPT

## 2021-11-11 PROCEDURE — 74019 RADEX ABDOMEN 2 VIEWS: CPT

## 2021-11-11 PROCEDURE — 85025 COMPLETE CBC W/AUTO DIFF WBC: CPT

## 2021-11-11 PROCEDURE — 36415 COLL VENOUS BLD VENIPUNCTURE: CPT

## 2021-11-11 PROCEDURE — 80053 COMPREHEN METABOLIC PANEL: CPT

## 2022-01-06 ENCOUNTER — HOSPITAL ENCOUNTER (OUTPATIENT)
Age: 82
Discharge: ANOTHER HOSPITAL | End: 2022-01-06
Payer: MEDICARE

## 2022-01-06 VITALS — BODY MASS INDEX: 33.1 KG/M2

## 2022-01-06 DIAGNOSIS — D64.9: Primary | ICD-10-CM

## 2022-01-06 DIAGNOSIS — D64.9: ICD-10-CM

## 2022-01-06 DIAGNOSIS — M79.89: Primary | ICD-10-CM

## 2022-01-06 LAB
ALANINE AMINOTRANSFER ALT/SGPT: 16 U/L (ref 13–56)
ALBUMIN SERPL-MCNC: 2.7 G/DL (ref 3.2–5)
ALKALINE PHOSPHATASE: 146 U/L (ref 45–117)
ANION GAP: 9 (ref 5–15)
AST(SGOT): 16 U/L (ref 15–37)
BUN SERPL-MCNC: 14 MG/DL (ref 7–18)
BUN/CREAT RATIO: 15.1 RATIO (ref 10–20)
CALCIUM SERPL-MCNC: 8.9 MG/DL (ref 8.5–10.1)
CARBON DIOXIDE: 26 MMOL/L (ref 21–32)
CHLORIDE: 102 MMOL/L (ref 98–107)
DEPRECATED RDW RBC: 65.1 FL (ref 35.1–43.9)
DIFFERENTIAL COMMENT: (no result)
ERYTHROCYTE [DISTWIDTH] IN BLOOD: 16.9 % (ref 11.6–14.6)
EST GLOM FILT RATE - AFR AMER: 75 ML/MIN (ref 60–?)
GLOBULIN: 4.4 G/DL (ref 2.2–4.2)
GLUCOSE: 90 MG/DL (ref 74–106)
HCT VFR BLD AUTO: 29.4 % (ref 37–47)
HEMOGLOBIN: 9.2 G/DL (ref 12–15)
HGB BLD-MCNC: 9.2 G/DL (ref 12–15)
IMMATURE RETICULOCYTE FRACTION: 21.9 % (ref 3–15.9)
IRON BINDING CAPACITY,TOTAL: 281 UG/DL (ref 250–450)
IRON SPEC-MCNT: 38 UG/DL (ref 50–170)
MANUAL DIF COMMENT BLD-IMP: (no result)
MCV RBC: 103.2 FL (ref 81–99)
MEAN CORP HGB CONC: 31.3 G/DL (ref 32–36)
MEAN PLATELET VOL.: 9.6 FL (ref 6.2–12)
PLATELET # BLD: 434 K/MM3 (ref 150–450)
PLATELET COUNT: 434 K/MM3 (ref 150–450)
POSITIVE MORPHOLOGY: YES
POTASSIUM: 3.9 MMOL/L (ref 3.5–5.1)
RBC # BLD AUTO: 2.85 M/MM3 (ref 4.2–5.4)
RBC DISTRIBUTION WIDTH CV: 16.9 % (ref 11.6–14.6)
RBC DISTRIBUTION WIDTH SD: 65.1 FL (ref 35.1–43.9)
RETICS # AUTO: 3.56 % (ref 0.5–1.5)
SCAN INDICATED ON CBC? Y/N: (no result)
WBC # BLD AUTO: 7.7 K/MM3 (ref 4.4–11)
WHITE BLOOD COUNT: 7.7 K/MM3 (ref 4.4–11)

## 2022-01-06 PROCEDURE — 83540 ASSAY OF IRON: CPT

## 2022-01-06 PROCEDURE — 80053 COMPREHEN METABOLIC PANEL: CPT

## 2022-01-06 PROCEDURE — 93971 EXTREMITY STUDY: CPT

## 2022-01-06 PROCEDURE — 36415 COLL VENOUS BLD VENIPUNCTURE: CPT

## 2022-01-06 PROCEDURE — 85027 COMPLETE CBC AUTOMATED: CPT

## 2022-01-06 PROCEDURE — 85045 AUTOMATED RETICULOCYTE COUNT: CPT

## 2022-01-06 PROCEDURE — 84443 ASSAY THYROID STIM HORMONE: CPT

## 2022-01-06 PROCEDURE — 83550 IRON BINDING TEST: CPT

## 2022-02-22 ENCOUNTER — HOSPITAL ENCOUNTER (OUTPATIENT)
Dept: HOSPITAL 100 - CT | Age: 82
Discharge: HOME | End: 2022-02-22
Payer: MEDICARE

## 2022-02-22 VITALS — BODY MASS INDEX: 33.1 KG/M2

## 2022-02-22 DIAGNOSIS — C34.92: Primary | ICD-10-CM

## 2022-02-22 PROCEDURE — 71250 CT THORAX DX C-: CPT

## 2022-05-25 ENCOUNTER — HOSPITAL ENCOUNTER (OUTPATIENT)
Dept: HOSPITAL 100 - CT | Age: 82
Discharge: HOME | End: 2022-05-25
Payer: MEDICARE

## 2022-05-25 VITALS — BODY MASS INDEX: 33.1 KG/M2

## 2022-05-25 DIAGNOSIS — C34.92: Primary | ICD-10-CM

## 2022-05-25 PROCEDURE — 71250 CT THORAX DX C-: CPT

## 2022-08-30 ENCOUNTER — HOSPITAL ENCOUNTER (OUTPATIENT)
Age: 82
Discharge: HOME | End: 2022-08-30
Payer: MEDICARE

## 2022-08-30 VITALS — BODY MASS INDEX: 33.1 KG/M2

## 2022-08-30 DIAGNOSIS — I10: ICD-10-CM

## 2022-08-30 DIAGNOSIS — I25.10: Primary | ICD-10-CM

## 2022-08-30 DIAGNOSIS — M81.0: ICD-10-CM

## 2022-08-30 LAB
ALANINE AMINOTRANSFER ALT/SGPT: 17 U/L (ref 13–56)
ALBUMIN SERPL-MCNC: 3.6 G/DL (ref 3.2–5)
ALKALINE PHOSPHATASE: 136 U/L (ref 45–117)
ANION GAP: 9 (ref 5–15)
AST(SGOT): 17 U/L (ref 15–37)
BUN SERPL-MCNC: 9 MG/DL (ref 7–18)
BUN/CREAT RATIO: 13.7 RATIO (ref 10–20)
CALCIUM SERPL-MCNC: 8.6 MG/DL (ref 8.5–10.1)
CARBON DIOXIDE: 27 MMOL/L (ref 21–32)
CHLORIDE: 101 MMOL/L (ref 98–107)
CHOLEST SERPL-MCNC: 122 MG/DL
DEPRECATED RDW RBC: 45.8 FL (ref 35.1–43.9)
ERYTHROCYTE [DISTWIDTH] IN BLOOD: 14.3 % (ref 11.6–14.6)
EST GLOM FILT RATE - AFR AMER: 112 ML/MIN (ref 60–?)
GLOBULIN: 3.6 G/DL (ref 2.2–4.2)
GLUCOSE: 87 MG/DL (ref 74–106)
HCT VFR BLD AUTO: 33.9 % (ref 37–47)
HEMOGLOBIN: 10.8 G/DL (ref 12–15)
HGB BLD-MCNC: 10.8 G/DL (ref 12–15)
IMMATURE GRANULOCYTES COUNT: 0.02 X10^3/UL (ref 0–0)
MCV RBC: 88.7 FL (ref 81–99)
MEAN CORP HGB CONC: 31.9 G/DL (ref 32–36)
MEAN PLATELET VOL.: 10.5 FL (ref 6.2–12)
NRBC FLAGGED BY ANALYZER: 0 % (ref 0–5)
PLATELET # BLD: 303 K/MM3 (ref 150–450)
PLATELET COUNT: 303 K/MM3 (ref 150–450)
POTASSIUM: 3.9 MMOL/L (ref 3.5–5.1)
PTHIN: 58.6 PG/ML (ref 18.4–80.1)
RBC # BLD AUTO: 3.82 M/MM3 (ref 4.2–5.4)
RBC DISTRIBUTION WIDTH CV: 14.3 % (ref 11.6–14.6)
RBC DISTRIBUTION WIDTH SD: 45.8 FL (ref 35.1–43.9)
TRIGLYCERIDES: 81 MG/DL
VITAMIN D,25 HYDROXY: 111.4 NG/ML
VLDLC SERPL-MCNC: 16 MG/DL (ref 5–40)
WBC # BLD AUTO: 6.4 K/MM3 (ref 4.4–11)
WHITE BLOOD COUNT: 6.4 K/MM3 (ref 4.4–11)

## 2022-08-30 PROCEDURE — 82306 VITAMIN D 25 HYDROXY: CPT

## 2022-08-30 PROCEDURE — 85025 COMPLETE CBC W/AUTO DIFF WBC: CPT

## 2022-08-30 PROCEDURE — 84443 ASSAY THYROID STIM HORMONE: CPT

## 2022-08-30 PROCEDURE — 82330 ASSAY OF CALCIUM: CPT

## 2022-08-30 PROCEDURE — 80053 COMPREHEN METABOLIC PANEL: CPT

## 2022-08-30 PROCEDURE — 83970 ASSAY OF PARATHORMONE: CPT

## 2022-08-30 PROCEDURE — 80061 LIPID PANEL: CPT

## 2023-01-17 ENCOUNTER — HOSPITAL ENCOUNTER (OUTPATIENT)
Age: 83
Discharge: HOME | End: 2023-01-17
Payer: MEDICARE

## 2023-01-17 VITALS — BODY MASS INDEX: 33.1 KG/M2

## 2023-01-17 DIAGNOSIS — M25.511: ICD-10-CM

## 2023-01-17 DIAGNOSIS — D64.9: ICD-10-CM

## 2023-01-17 DIAGNOSIS — R11.0: Primary | ICD-10-CM

## 2023-01-17 LAB
ALANINE AMINOTRANSFER ALT/SGPT: 14 U/L (ref 13–56)
ALBUMIN SERPL-MCNC: 3.2 G/DL (ref 3.2–5)
ALKALINE PHOSPHATASE: 132 U/L (ref 45–117)
AMYLASE SERPL-CCNC: 22 U/L (ref 25–115)
ANION GAP: 7 (ref 5–15)
AST(SGOT): 16 U/L (ref 15–37)
BUN SERPL-MCNC: 11 MG/DL (ref 7–18)
BUN/CREAT RATIO: 16.1 RATIO (ref 10–20)
CALCIUM SERPL-MCNC: 8.9 MG/DL (ref 8.5–10.1)
CARBON DIOXIDE: 27 MMOL/L (ref 21–32)
CHLORIDE: 105 MMOL/L (ref 98–107)
DEPRECATED RDW RBC: 53.3 FL (ref 35.1–43.9)
ERYTHROCYTE [DISTWIDTH] IN BLOOD: 15.3 % (ref 11.6–14.6)
EST GLOM FILT RATE - AFR AMER: 106 ML/MIN (ref 60–?)
FERRITIN SERPL-MCNC: 22 NG/ML (ref 8–252)
GLOBULIN: 3.7 G/DL (ref 2.2–4.2)
GLUCOSE: 101 MG/DL (ref 74–106)
HCT VFR BLD AUTO: 32.5 % (ref 37–47)
HEMOGLOBIN: 9.8 G/DL (ref 12–15)
HGB BLD-MCNC: 9.8 G/DL (ref 12–15)
IMMATURE GRANULOCYTES COUNT: 0.01 X10^3/UL (ref 0–0)
IMMATURE RETICULOCYTE FRACTION: 21.2 % (ref 3–15.9)
IRON SPEC-MCNT: 25 UG/DL (ref 50–170)
LIPASE: 138 U/L (ref 73–393)
MCV RBC: 94.2 FL (ref 81–99)
MEAN CORP HGB CONC: 30.2 G/DL (ref 32–36)
MEAN PLATELET VOL.: 10.4 FL (ref 6.2–12)
NRBC FLAGGED BY ANALYZER: 0 % (ref 0–5)
PLATELET # BLD: 351 K/MM3 (ref 150–450)
PLATELET COUNT: 351 K/MM3 (ref 150–450)
POTASSIUM: 4 MMOL/L (ref 3.5–5.1)
RBC # BLD AUTO: 3.45 M/MM3 (ref 4.2–5.4)
RBC DISTRIBUTION WIDTH CV: 15.3 % (ref 11.6–14.6)
RBC DISTRIBUTION WIDTH SD: 53.3 FL (ref 35.1–43.9)
RETICS # AUTO: 2.05 % (ref 0.5–1.5)
VITAMIN B12: 685 PG/ML (ref 211–911)
WBC # BLD AUTO: 7.8 K/MM3 (ref 4.4–11)
WHITE BLOOD COUNT: 7.8 K/MM3 (ref 4.4–11)

## 2023-01-17 PROCEDURE — 85045 AUTOMATED RETICULOCYTE COUNT: CPT

## 2023-01-17 PROCEDURE — 85652 RBC SED RATE AUTOMATED: CPT

## 2023-01-17 PROCEDURE — 36415 COLL VENOUS BLD VENIPUNCTURE: CPT

## 2023-01-17 PROCEDURE — 83690 ASSAY OF LIPASE: CPT

## 2023-01-17 PROCEDURE — 83540 ASSAY OF IRON: CPT

## 2023-01-17 PROCEDURE — 82607 VITAMIN B-12: CPT

## 2023-01-17 PROCEDURE — 82728 ASSAY OF FERRITIN: CPT

## 2023-01-17 PROCEDURE — 73030 X-RAY EXAM OF SHOULDER: CPT

## 2023-01-17 PROCEDURE — 80053 COMPREHEN METABOLIC PANEL: CPT

## 2023-01-17 PROCEDURE — 74022 RADEX COMPL AQT ABD SERIES: CPT

## 2023-01-17 PROCEDURE — 82150 ASSAY OF AMYLASE: CPT

## 2023-01-17 PROCEDURE — 85025 COMPLETE CBC W/AUTO DIFF WBC: CPT

## 2023-01-24 ENCOUNTER — HOSPITAL ENCOUNTER (OUTPATIENT)
Dept: HOSPITAL 100 - CT | Age: 83
Discharge: HOME | End: 2023-01-24
Payer: MEDICARE

## 2023-01-24 VITALS — BODY MASS INDEX: 33.1 KG/M2

## 2023-01-24 DIAGNOSIS — C34.92: Primary | ICD-10-CM

## 2023-10-18 ENCOUNTER — LAB REQUISITION (OUTPATIENT)
Dept: LAB | Facility: HOSPITAL | Age: 83
End: 2023-10-18
Payer: MEDICARE

## 2023-10-18 DIAGNOSIS — R30.0 DYSURIA: ICD-10-CM

## 2023-10-18 LAB
APPEARANCE UR: CLEAR
BACTERIA #/AREA URNS AUTO: ABNORMAL /HPF
BILIRUB UR STRIP.AUTO-MCNC: NEGATIVE MG/DL
COLOR UR: ABNORMAL
GLUCOSE UR STRIP.AUTO-MCNC: NEGATIVE MG/DL
KETONES UR STRIP.AUTO-MCNC: NEGATIVE MG/DL
LEUKOCYTE ESTERASE UR QL STRIP.AUTO: ABNORMAL
MUCOUS THREADS #/AREA URNS AUTO: ABNORMAL /LPF
NITRITE UR QL STRIP.AUTO: NEGATIVE
PH UR STRIP.AUTO: 7 [PH]
PROT UR STRIP.AUTO-MCNC: NEGATIVE MG/DL
RBC # UR STRIP.AUTO: NEGATIVE /UL
RBC #/AREA URNS AUTO: ABNORMAL /HPF
SP GR UR STRIP.AUTO: 1.01
UROBILINOGEN UR STRIP.AUTO-MCNC: <2 MG/DL
WBC #/AREA URNS AUTO: ABNORMAL /HPF

## 2023-10-18 PROCEDURE — 87086 URINE CULTURE/COLONY COUNT: CPT | Mod: OUT,CMCLAB,SAMLAB | Performed by: FAMILY MEDICINE

## 2023-10-18 PROCEDURE — 81003 URINALYSIS AUTO W/O SCOPE: CPT | Mod: OUT | Performed by: FAMILY MEDICINE

## 2023-10-20 LAB — BACTERIA UR CULT: NORMAL

## 2024-01-06 ENCOUNTER — APPOINTMENT (OUTPATIENT)
Dept: RADIOLOGY | Facility: HOSPITAL | Age: 84
End: 2024-01-06
Payer: MEDICARE

## 2024-01-06 ENCOUNTER — HOSPITAL ENCOUNTER (EMERGENCY)
Facility: HOSPITAL | Age: 84
Discharge: HOME | End: 2024-01-06
Payer: MEDICARE

## 2024-01-06 VITALS
HEART RATE: 77 BPM | TEMPERATURE: 98.2 F | HEIGHT: 62 IN | RESPIRATION RATE: 18 BRPM | SYSTOLIC BLOOD PRESSURE: 123 MMHG | OXYGEN SATURATION: 94 % | BODY MASS INDEX: 22.08 KG/M2 | DIASTOLIC BLOOD PRESSURE: 67 MMHG | WEIGHT: 120 LBS

## 2024-01-06 DIAGNOSIS — C80.1 MALIGNANCY (MULTI): ICD-10-CM

## 2024-01-06 DIAGNOSIS — R11.2 NAUSEA AND VOMITING, UNSPECIFIED VOMITING TYPE: Primary | ICD-10-CM

## 2024-01-06 LAB
ALBUMIN SERPL BCP-MCNC: 3.4 G/DL (ref 3.4–5)
ALP SERPL-CCNC: 126 U/L (ref 33–136)
ALT SERPL W P-5'-P-CCNC: 8 U/L (ref 7–45)
ANION GAP SERPL CALC-SCNC: 9 MMOL/L (ref 10–20)
APPEARANCE UR: ABNORMAL
AST SERPL W P-5'-P-CCNC: 18 U/L (ref 9–39)
BASOPHILS # BLD AUTO: 0.04 X10*3/UL (ref 0–0.1)
BASOPHILS NFR BLD AUTO: 0.7 %
BILIRUB SERPL-MCNC: 0.5 MG/DL (ref 0–1.2)
BILIRUB UR STRIP.AUTO-MCNC: NEGATIVE MG/DL
BUN SERPL-MCNC: 5 MG/DL (ref 6–23)
CALCIUM SERPL-MCNC: 8.7 MG/DL (ref 8.6–10.3)
CHLORIDE SERPL-SCNC: 101 MMOL/L (ref 98–107)
CO2 SERPL-SCNC: 29 MMOL/L (ref 21–32)
COLOR UR: YELLOW
CREAT SERPL-MCNC: 0.59 MG/DL (ref 0.5–1.05)
EOSINOPHIL # BLD AUTO: 0.15 X10*3/UL (ref 0–0.4)
EOSINOPHIL NFR BLD AUTO: 2.5 %
ERYTHROCYTE [DISTWIDTH] IN BLOOD BY AUTOMATED COUNT: 13.5 % (ref 11.5–14.5)
GFR SERPL CREATININE-BSD FRML MDRD: 90 ML/MIN/1.73M*2
GLUCOSE SERPL-MCNC: 107 MG/DL (ref 74–99)
GLUCOSE UR STRIP.AUTO-MCNC: NEGATIVE MG/DL
HCT VFR BLD AUTO: 37 % (ref 36–46)
HGB BLD-MCNC: 12.3 G/DL (ref 12–16)
HOLD SPECIMEN: NORMAL
HOLD SPECIMEN: NORMAL
IMM GRANULOCYTES # BLD AUTO: 0.03 X10*3/UL (ref 0–0.5)
IMM GRANULOCYTES NFR BLD AUTO: 0.5 % (ref 0–0.9)
KETONES UR STRIP.AUTO-MCNC: ABNORMAL MG/DL
LEUKOCYTE ESTERASE UR QL STRIP.AUTO: NEGATIVE
LIPASE SERPL-CCNC: 37 U/L (ref 9–82)
LYMPHOCYTES # BLD AUTO: 0.64 X10*3/UL (ref 0.8–3)
LYMPHOCYTES NFR BLD AUTO: 10.6 %
MCH RBC QN AUTO: 31.4 PG (ref 26–34)
MCHC RBC AUTO-ENTMCNC: 33.2 G/DL (ref 32–36)
MCV RBC AUTO: 94 FL (ref 80–100)
MONOCYTES # BLD AUTO: 0.55 X10*3/UL (ref 0.05–0.8)
MONOCYTES NFR BLD AUTO: 9.1 %
NEUTROPHILS # BLD AUTO: 4.65 X10*3/UL (ref 1.6–5.5)
NEUTROPHILS NFR BLD AUTO: 76.6 %
NITRITE UR QL STRIP.AUTO: NEGATIVE
NRBC BLD-RTO: 0 /100 WBCS (ref 0–0)
PH UR STRIP.AUTO: 8 [PH]
PLATELET # BLD AUTO: 302 X10*3/UL (ref 150–450)
POTASSIUM SERPL-SCNC: 4 MMOL/L (ref 3.5–5.3)
PROT SERPL-MCNC: 6 G/DL (ref 6.4–8.2)
PROT UR STRIP.AUTO-MCNC: NEGATIVE MG/DL
RBC # BLD AUTO: 3.92 X10*6/UL (ref 4–5.2)
RBC # UR STRIP.AUTO: NEGATIVE /UL
SODIUM SERPL-SCNC: 135 MMOL/L (ref 136–145)
SP GR UR STRIP.AUTO: 1.01
UROBILINOGEN UR STRIP.AUTO-MCNC: <2 MG/DL
WBC # BLD AUTO: 6.1 X10*3/UL (ref 4.4–11.3)

## 2024-01-06 PROCEDURE — 74022 RADEX COMPL AQT ABD SERIES: CPT

## 2024-01-06 PROCEDURE — 2500000004 HC RX 250 GENERAL PHARMACY W/ HCPCS (ALT 636 FOR OP/ED): Performed by: PHYSICIAN ASSISTANT

## 2024-01-06 PROCEDURE — 74022 RADEX COMPL AQT ABD SERIES: CPT | Performed by: STUDENT IN AN ORGANIZED HEALTH CARE EDUCATION/TRAINING PROGRAM

## 2024-01-06 PROCEDURE — 84075 ASSAY ALKALINE PHOSPHATASE: CPT | Performed by: PHYSICIAN ASSISTANT

## 2024-01-06 PROCEDURE — 36415 COLL VENOUS BLD VENIPUNCTURE: CPT | Performed by: PHYSICIAN ASSISTANT

## 2024-01-06 PROCEDURE — 83690 ASSAY OF LIPASE: CPT | Performed by: PHYSICIAN ASSISTANT

## 2024-01-06 PROCEDURE — 81003 URINALYSIS AUTO W/O SCOPE: CPT | Performed by: PHYSICIAN ASSISTANT

## 2024-01-06 PROCEDURE — 96374 THER/PROPH/DIAG INJ IV PUSH: CPT

## 2024-01-06 PROCEDURE — 85025 COMPLETE CBC W/AUTO DIFF WBC: CPT | Performed by: PHYSICIAN ASSISTANT

## 2024-01-06 PROCEDURE — 99284 EMERGENCY DEPT VISIT MOD MDM: CPT

## 2024-01-06 RX ORDER — ASPIRIN 81 MG/1
81 TABLET ORAL 2 TIMES DAILY
COMMUNITY

## 2024-01-06 RX ORDER — MORPHINE SULFATE 15 MG/1
15 TABLET ORAL EVERY 4 HOURS PRN
COMMUNITY

## 2024-01-06 RX ORDER — PROCHLORPERAZINE MALEATE 5 MG
10 TABLET ORAL EVERY 8 HOURS PRN
COMMUNITY

## 2024-01-06 RX ORDER — BISACODYL 5 MG
5 TABLET, DELAYED RELEASE (ENTERIC COATED) ORAL DAILY PRN
COMMUNITY

## 2024-01-06 RX ORDER — ONDANSETRON HYDROCHLORIDE 2 MG/ML
4 INJECTION, SOLUTION INTRAVENOUS ONCE
Status: COMPLETED | OUTPATIENT
Start: 2024-01-06 | End: 2024-01-06

## 2024-01-06 RX ORDER — TIZANIDINE 4 MG/1
4 TABLET ORAL EVERY 8 HOURS PRN
COMMUNITY

## 2024-01-06 RX ORDER — ONDANSETRON 4 MG/1
4 TABLET, FILM COATED ORAL EVERY 6 HOURS PRN
COMMUNITY

## 2024-01-06 RX ORDER — ADHESIVE BANDAGE
30 BANDAGE TOPICAL DAILY PRN
COMMUNITY

## 2024-01-06 RX ORDER — METOPROLOL TARTRATE 25 MG/1
12.5 TABLET, FILM COATED ORAL 2 TIMES DAILY
COMMUNITY

## 2024-01-06 RX ORDER — POLYETHYLENE GLYCOL 3350 17 G/17G
17 POWDER, FOR SOLUTION ORAL DAILY
COMMUNITY

## 2024-01-06 RX ORDER — MORPHINE SULFATE 15 MG/1
15 TABLET, FILM COATED, EXTENDED RELEASE ORAL 2 TIMES DAILY
COMMUNITY
End: 2024-02-01 | Stop reason: ENTERED-IN-ERROR

## 2024-01-06 RX ORDER — FERROUS SULFATE 325(65) MG
325 TABLET, DELAYED RELEASE (ENTERIC COATED) ORAL DAILY
COMMUNITY

## 2024-01-06 RX ORDER — GABAPENTIN 100 MG/1
100 CAPSULE ORAL EVERY 8 HOURS PRN
COMMUNITY

## 2024-01-06 RX ORDER — MORPHINE SULFATE 15 MG/1
7.5 TABLET ORAL EVERY 4 HOURS PRN
COMMUNITY

## 2024-01-06 RX ORDER — PANTOPRAZOLE SODIUM 40 MG/1
40 TABLET, DELAYED RELEASE ORAL DAILY
COMMUNITY

## 2024-01-06 RX ORDER — ACETAMINOPHEN 500 MG
1000 TABLET ORAL EVERY 6 HOURS PRN
COMMUNITY

## 2024-01-06 RX ORDER — CITALOPRAM 20 MG/1
20 TABLET, FILM COATED ORAL DAILY
COMMUNITY

## 2024-01-06 RX ORDER — LOSARTAN POTASSIUM 25 MG/1
25 TABLET ORAL DAILY
COMMUNITY

## 2024-01-06 RX ORDER — DOCUSATE SODIUM 100 MG/1
100 CAPSULE, LIQUID FILLED ORAL 2 TIMES DAILY
COMMUNITY

## 2024-01-06 RX ORDER — ATORVASTATIN CALCIUM 80 MG/1
80 TABLET, FILM COATED ORAL NIGHTLY
COMMUNITY

## 2024-01-06 RX ORDER — FUROSEMIDE 40 MG/1
40 TABLET ORAL 2 TIMES DAILY
COMMUNITY

## 2024-01-06 RX ORDER — ONDANSETRON 4 MG/1
4 TABLET, ORALLY DISINTEGRATING ORAL EVERY 8 HOURS PRN
Qty: 9 TABLET | Refills: 0 | Status: SHIPPED | OUTPATIENT
Start: 2024-01-06 | End: 2024-01-09

## 2024-01-06 RX ORDER — ERGOCALCIFEROL 1.25 MG/1
1.25 CAPSULE ORAL
COMMUNITY

## 2024-01-06 RX ORDER — FOLIC ACID 1 MG/1
1 TABLET ORAL EVERY MORNING
COMMUNITY

## 2024-01-06 RX ORDER — TAMSULOSIN HYDROCHLORIDE 0.4 MG/1
0.4 CAPSULE ORAL NIGHTLY
COMMUNITY

## 2024-01-06 RX ADMIN — SODIUM CHLORIDE 1000 ML: 9 INJECTION, SOLUTION INTRAVENOUS at 12:05

## 2024-01-06 RX ADMIN — ONDANSETRON 4 MG: 2 INJECTION INTRAMUSCULAR; INTRAVENOUS at 12:58

## 2024-01-06 ASSESSMENT — ENCOUNTER SYMPTOMS
CHILLS: 0
DYSURIA: 0
COLOR CHANGE: 0
SORE THROAT: 0
FEVER: 0
HEMATURIA: 0
EYE PAIN: 0
ARTHRALGIAS: 0
PALPITATIONS: 0
BACK PAIN: 0
VOMITING: 1
SEIZURES: 0
COUGH: 0
SHORTNESS OF BREATH: 0
ABDOMINAL PAIN: 0
NAUSEA: 1

## 2024-01-06 ASSESSMENT — COLUMBIA-SUICIDE SEVERITY RATING SCALE - C-SSRS
2. HAVE YOU ACTUALLY HAD ANY THOUGHTS OF KILLING YOURSELF?: NO
6. HAVE YOU EVER DONE ANYTHING, STARTED TO DO ANYTHING, OR PREPARED TO DO ANYTHING TO END YOUR LIFE?: NO
1. IN THE PAST MONTH, HAVE YOU WISHED YOU WERE DEAD OR WISHED YOU COULD GO TO SLEEP AND NOT WAKE UP?: NO

## 2024-01-06 ASSESSMENT — PAIN - FUNCTIONAL ASSESSMENT
PAIN_FUNCTIONAL_ASSESSMENT: 0-10
PAIN_FUNCTIONAL_ASSESSMENT: 0-10

## 2024-01-06 ASSESSMENT — PAIN SCALES - GENERAL
PAINLEVEL_OUTOF10: 0 - NO PAIN
PAINLEVEL_OUTOF10: 0 - NO PAIN

## 2024-01-06 NOTE — ED PROVIDER NOTES
"Patient is an 83-year-old female with reported pancreatic cancer who presents to the emergency department with a chief complaint of decreased appetite.  Patient reports that she vomited this morning.  She states that she was receiving radiation treatment at [facility] and recently had a stent placed in her bile duct at the end of December.  Patient states that she is here to \"get better.\"  She states that she is tired of feeling sick and wants us to make her feel better.  She states that she has not received any chemotherapy for her cancer as this was her decision and reported that she did not want surgery or any chemotherapy. She denies any chest pain or shortness of breath. No abdominal pain. No fever or chills           Review of Systems   Constitutional:  Negative for chills and fever.   HENT:  Negative for ear pain and sore throat.    Eyes:  Negative for pain and visual disturbance.   Respiratory:  Negative for cough and shortness of breath.    Cardiovascular:  Negative for chest pain and palpitations.   Gastrointestinal:  Positive for nausea and vomiting. Negative for abdominal pain.   Genitourinary:  Negative for dysuria and hematuria.   Musculoskeletal:  Negative for arthralgias and back pain.   Skin:  Negative for color change and rash.   Neurological:  Negative for seizures and syncope.   All other systems reviewed and are negative.       Physical Exam  Vitals and nursing note reviewed.   Constitutional:       General: She is not in acute distress.     Appearance: She is well-developed.   HENT:      Head: Normocephalic and atraumatic.   Eyes:      Conjunctiva/sclera: Conjunctivae normal.   Cardiovascular:      Rate and Rhythm: Normal rate and regular rhythm.      Heart sounds: No murmur heard.  Pulmonary:      Effort: Pulmonary effort is normal. No respiratory distress.      Breath sounds: Normal breath sounds.   Abdominal:      General: There is no distension.      Palpations: Abdomen is soft. There is no " mass.      Tenderness: There is no abdominal tenderness. There is no guarding or rebound.      Hernia: No hernia is present.   Musculoskeletal:         General: No swelling.      Cervical back: Neck supple.   Skin:     General: Skin is warm and dry.      Capillary Refill: Capillary refill takes less than 2 seconds.   Neurological:      General: No focal deficit present.      Mental Status: She is alert.   Psychiatric:         Mood and Affect: Mood normal.          Labs Reviewed   CBC WITH AUTO DIFFERENTIAL - Abnormal       Result Value    WBC 6.1      nRBC 0.0      RBC 3.92 (*)     Hemoglobin 12.3      Hematocrit 37.0      MCV 94      MCH 31.4      MCHC 33.2      RDW 13.5      Platelets 302      Neutrophils % 76.6      Immature Granulocytes %, Automated 0.5      Lymphocytes % 10.6      Monocytes % 9.1      Eosinophils % 2.5      Basophils % 0.7      Neutrophils Absolute 4.65      Immature Granulocytes Absolute, Automated 0.03      Lymphocytes Absolute 0.64 (*)     Monocytes Absolute 0.55      Eosinophils Absolute 0.15      Basophils Absolute 0.04     COMPREHENSIVE METABOLIC PANEL - Abnormal    Glucose 107 (*)     Sodium 135 (*)     Potassium 4.0      Chloride 101      Bicarbonate 29      Anion Gap 9 (*)     Urea Nitrogen 5 (*)     Creatinine 0.59      eGFR 90      Calcium 8.7      Albumin 3.4      Alkaline Phosphatase 126      Total Protein 6.0 (*)     AST 18      Bilirubin, Total 0.5      ALT 8     LIPASE - Normal    Lipase 37      Narrative:     Venipuncture immediately after or during the administration of Metamizole may lead to falsely low results. Testing should be performed immediately prior to Metamizole dosing.   GRAY TOP    Extra Tube Hold for add-ons.     URINALYSIS WITH REFLEX CULTURE AND MICROSCOPIC    Narrative:     The following orders were created for panel order Urinalysis with Reflex Culture and Microscopic.  Procedure                               Abnormality         Status                    "  ---------                               -----------         ------                     Urinalysis with Reflex C...[[id number]]                                                 Extra Urine Gray Tube[[id number]]                                                         Please view results for these tests on the individual orders.   URINALYSIS WITH REFLEX CULTURE AND MICROSCOPIC   EXTRA URINE GRAY TUBE        XR abdomen 2 views w chest 1 view   Final Result   Nonobstructive bowel gas pattern.        Stable left suprahilar masslike opacities without acute   cardiopulmonary process.        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  Her emesis was reported to be nonbloody in nature.  I had a long discussion with the patient and she states that she would like to feel better.  She is tired of \"being sick.\"  I did have a long conversation with the patient that this was likely her disease process and we will try and control her symptoms.  Patient recently had a CT scan of her abdomen pelvis 3 days ago that showed no acute findings.  At this time I do not feel that a repeat CT scan was indicated as her abdomen was soft and nontender and she denied pain.  An acute abdominal series was obtained which shows no evidence of a bowel obstruction, free air and stable masslike opacities in her lungs.  Patient does have a history of lung cancer as well.  Patient is not actively undergoing chemotherapy.  I did have a long discussion with the patient's daughter as well as the patient gave me permission to speak " with her daughter.  Patient's daughter states that she did speak with the patient's nephrologist who states that this is likely disease progression as well.  She states that palliative care is involved and feels that potentially patient needs to be placed on hospice for better management of her symptoms.  At this time patient's laboratory studies are unremarkable and no indication for admission.  Patient was able to eat and drink drink while here in the emergency department and will be discharged. Discussed this with patient and she feels comfortable with this plan.    Amount and/or Complexity of Data Reviewed  Labs: ordered. Decision-making details documented in ED Course.  Radiology: ordered and independent interpretation performed. Decision-making details documented in ED Course.         Diagnoses as of 01/06/24 1518   Nausea and vomiting, unspecified vomiting type   Malignancy (CMS/HCC)                    Glory Hall PA-C  01/06/24 1512

## 2024-01-07 LAB — HOLD SPECIMEN: NORMAL

## 2024-01-09 ENCOUNTER — HOSPITAL ENCOUNTER (OUTPATIENT)
Age: 84
Discharge: HOME | End: 2024-01-09
Payer: MEDICARE

## 2024-01-09 VITALS — BODY MASS INDEX: 33.1 KG/M2

## 2024-01-09 DIAGNOSIS — M81.0: ICD-10-CM

## 2024-01-09 DIAGNOSIS — R63.4: Primary | ICD-10-CM

## 2024-01-09 DIAGNOSIS — C25.9: ICD-10-CM

## 2024-01-09 LAB
ALANINE AMINOTRANSFER ALT/SGPT: 14 U/L (ref 13–56)
ALBUMIN SERPL-MCNC: 2.9 G/DL (ref 3.2–5)
ALKALINE PHOSPHATASE: 135 U/L (ref 45–117)
ANION GAP: 6 (ref 5–15)
AST(SGOT): 18 U/L (ref 15–37)
BUN SERPL-MCNC: 6 MG/DL (ref 7–18)
BUN/CREAT RATIO: 9.8 RATIO (ref 10–20)
CALCIUM SERPL-MCNC: 8.5 MG/DL (ref 8.5–10.1)
CARBON DIOXIDE: 25 MMOL/L (ref 21–32)
CHLORIDE: 106 MMOL/L (ref 98–107)
DEPRECATED RDW RBC: 49.9 FL (ref 35.1–43.9)
ERYTHROCYTE [DISTWIDTH] IN BLOOD: 13.9 % (ref 11.6–14.6)
EST GLOM FILT RATE - AFR AMER: 121 ML/MIN (ref 60–?)
GLOBULIN: 3.4 G/DL (ref 2.2–4.2)
GLUCOSE: 91 MG/DL (ref 74–106)
HCT VFR BLD AUTO: 38.2 % (ref 37–47)
HGB BLD-MCNC: 11.8 G/DL (ref 12–15)
IMMATURE GRANULOCYTES COUNT: 0.02 X10^3/UL (ref 0–0)
MCV RBC: 97.2 FL (ref 81–99)
MEAN CORP HGB CONC: 30.9 G/DL (ref 32–36)
MEAN PLATELET VOL.: 10.6 FL (ref 6.2–12)
NRBC FLAGGED BY ANALYZER: 0 % (ref 0–5)
PLATELET # BLD: 313 K/MM3 (ref 150–450)
POTASSIUM: 4.9 MMOL/L (ref 3.5–5.1)
RBC # BLD AUTO: 3.93 M/MM3 (ref 4.2–5.4)
VITAMIN D,25 HYDROXY: > 150 NG/ML (ref 29.95–100.01)
WBC # BLD AUTO: 6.6 K/MM3 (ref 4.4–11)

## 2024-01-09 PROCEDURE — 82306 VITAMIN D 25 HYDROXY: CPT

## 2024-01-09 PROCEDURE — 84443 ASSAY THYROID STIM HORMONE: CPT

## 2024-01-09 PROCEDURE — 85025 COMPLETE CBC W/AUTO DIFF WBC: CPT

## 2024-01-09 PROCEDURE — 36415 COLL VENOUS BLD VENIPUNCTURE: CPT

## 2024-01-09 PROCEDURE — 80053 COMPREHEN METABOLIC PANEL: CPT

## 2024-01-09 PROCEDURE — 82378 CARCINOEMBRYONIC ANTIGEN: CPT

## 2024-01-11 LAB — CEA SERPL-MCNC: 5 NG/ML (ref 0–4.7)

## 2024-01-29 ENCOUNTER — PATIENT OUTREACH (OUTPATIENT)
Dept: CARE COORDINATION | Facility: CLINIC | Age: 84
End: 2024-01-29
Payer: MEDICARE

## 2024-01-29 DIAGNOSIS — M17.11 PRIMARY OSTEOARTHRITIS OF RIGHT KNEE: ICD-10-CM

## 2024-01-29 DIAGNOSIS — I21.4 NSTEMI (NON-ST ELEVATED MYOCARDIAL INFARCTION) (MULTI): ICD-10-CM

## 2024-01-29 DIAGNOSIS — J18.9 PNEUMONIA DUE TO INFECTIOUS ORGANISM, UNSPECIFIED LATERALITY, UNSPECIFIED PART OF LUNG: ICD-10-CM

## 2024-01-29 DIAGNOSIS — J96.01 ACUTE HYPOXEMIC RESPIRATORY FAILURE (MULTI): ICD-10-CM

## 2024-01-29 NOTE — PROGRESS NOTES
Discharge Facility:[facility]  Discharge Diagnosis:I21.4 NSTEMI, J18.9 Pneumonia, J96.01 Acute hypoxemic respiratory failure, M17.11 Primary osteoarthritis of right knee  Admission Date:1/18/24  Discharge Date:1/26/24     PCP Appointment Date:No contact made task sent to office  Specialist Appointment Date: N/A  Hospital Encounter and Summary: Linked     2 call attempts made

## 2024-02-01 ENCOUNTER — APPOINTMENT (OUTPATIENT)
Dept: RADIOLOGY | Facility: HOSPITAL | Age: 84
End: 2024-02-01
Payer: MEDICARE

## 2024-02-01 ENCOUNTER — HOSPITAL ENCOUNTER (EMERGENCY)
Facility: HOSPITAL | Age: 84
Discharge: HOME | End: 2024-02-01
Attending: EMERGENCY MEDICINE
Payer: MEDICARE

## 2024-02-01 ENCOUNTER — APPOINTMENT (OUTPATIENT)
Dept: CARDIOLOGY | Facility: HOSPITAL | Age: 84
End: 2024-02-01
Payer: MEDICARE

## 2024-02-01 VITALS
SYSTOLIC BLOOD PRESSURE: 117 MMHG | OXYGEN SATURATION: 98 % | TEMPERATURE: 97.6 F | HEART RATE: 77 BPM | BODY MASS INDEX: 23.92 KG/M2 | RESPIRATION RATE: 19 BRPM | DIASTOLIC BLOOD PRESSURE: 60 MMHG | HEIGHT: 62 IN | WEIGHT: 130 LBS

## 2024-02-01 DIAGNOSIS — R06.02 SHORTNESS OF BREATH: Primary | ICD-10-CM

## 2024-02-01 DIAGNOSIS — J44.1 COPD EXACERBATION (MULTI): ICD-10-CM

## 2024-02-01 LAB
ALBUMIN SERPL BCP-MCNC: 3.3 G/DL (ref 3.4–5)
ALP SERPL-CCNC: 91 U/L (ref 33–136)
ALT SERPL W P-5'-P-CCNC: 9 U/L (ref 7–45)
ANION GAP SERPL CALC-SCNC: 9 MMOL/L (ref 10–20)
AST SERPL W P-5'-P-CCNC: 11 U/L (ref 9–39)
BASOPHILS # BLD AUTO: 0.03 X10*3/UL (ref 0–0.1)
BASOPHILS NFR BLD AUTO: 0.4 %
BILIRUB SERPL-MCNC: 0.4 MG/DL (ref 0–1.2)
BUN SERPL-MCNC: 14 MG/DL (ref 6–23)
CALCIUM SERPL-MCNC: 9 MG/DL (ref 8.6–10.3)
CARDIAC TROPONIN I PNL SERPL HS: 13 NG/L (ref 0–13)
CARDIAC TROPONIN I PNL SERPL HS: 15 NG/L (ref 0–13)
CHLORIDE SERPL-SCNC: 98 MMOL/L (ref 98–107)
CO2 SERPL-SCNC: 34 MMOL/L (ref 21–32)
CREAT SERPL-MCNC: 0.5 MG/DL (ref 0.5–1.05)
EGFRCR SERPLBLD CKD-EPI 2021: >90 ML/MIN/1.73M*2
EOSINOPHIL # BLD AUTO: 0.04 X10*3/UL (ref 0–0.4)
EOSINOPHIL NFR BLD AUTO: 0.6 %
ERYTHROCYTE [DISTWIDTH] IN BLOOD BY AUTOMATED COUNT: 13.8 % (ref 11.5–14.5)
GLUCOSE SERPL-MCNC: 99 MG/DL (ref 74–99)
HCT VFR BLD AUTO: 33.6 % (ref 36–46)
HGB BLD-MCNC: 10.8 G/DL (ref 12–16)
HOLD SPECIMEN: NORMAL
IMM GRANULOCYTES # BLD AUTO: 0.03 X10*3/UL (ref 0–0.5)
IMM GRANULOCYTES NFR BLD AUTO: 0.4 % (ref 0–0.9)
LYMPHOCYTES # BLD AUTO: 0.76 X10*3/UL (ref 0.8–3)
LYMPHOCYTES NFR BLD AUTO: 10.5 %
MCH RBC QN AUTO: 30.3 PG (ref 26–34)
MCHC RBC AUTO-ENTMCNC: 32.1 G/DL (ref 32–36)
MCV RBC AUTO: 94 FL (ref 80–100)
MONOCYTES # BLD AUTO: 0.52 X10*3/UL (ref 0.05–0.8)
MONOCYTES NFR BLD AUTO: 7.2 %
NEUTROPHILS # BLD AUTO: 5.88 X10*3/UL (ref 1.6–5.5)
NEUTROPHILS NFR BLD AUTO: 80.9 %
NRBC BLD-RTO: 0 /100 WBCS (ref 0–0)
PLATELET # BLD AUTO: 280 X10*3/UL (ref 150–450)
POTASSIUM SERPL-SCNC: 3.9 MMOL/L (ref 3.5–5.3)
PROT SERPL-MCNC: 6.3 G/DL (ref 6.4–8.2)
RBC # BLD AUTO: 3.57 X10*6/UL (ref 4–5.2)
SODIUM SERPL-SCNC: 137 MMOL/L (ref 136–145)
WBC # BLD AUTO: 7.3 X10*3/UL (ref 4.4–11.3)

## 2024-02-01 PROCEDURE — 2500000002 HC RX 250 W HCPCS SELF ADMINISTERED DRUGS (ALT 637 FOR MEDICARE OP, ALT 636 FOR OP/ED)

## 2024-02-01 PROCEDURE — 2500000002 HC RX 250 W HCPCS SELF ADMINISTERED DRUGS (ALT 637 FOR MEDICARE OP, ALT 636 FOR OP/ED): Performed by: EMERGENCY MEDICINE

## 2024-02-01 PROCEDURE — 94664 DEMO&/EVAL PT USE INHALER: CPT

## 2024-02-01 PROCEDURE — 99284 EMERGENCY DEPT VISIT MOD MDM: CPT

## 2024-02-01 PROCEDURE — 71045 X-RAY EXAM CHEST 1 VIEW: CPT

## 2024-02-01 PROCEDURE — 36415 COLL VENOUS BLD VENIPUNCTURE: CPT | Performed by: EMERGENCY MEDICINE

## 2024-02-01 PROCEDURE — 2500000005 HC RX 250 GENERAL PHARMACY W/O HCPCS: Performed by: EMERGENCY MEDICINE

## 2024-02-01 PROCEDURE — 93005 ELECTROCARDIOGRAM TRACING: CPT

## 2024-02-01 PROCEDURE — 99285 EMERGENCY DEPT VISIT HI MDM: CPT | Performed by: EMERGENCY MEDICINE

## 2024-02-01 PROCEDURE — 94761 N-INVAS EAR/PLS OXIMETRY MLT: CPT

## 2024-02-01 PROCEDURE — 84075 ASSAY ALKALINE PHOSPHATASE: CPT | Performed by: EMERGENCY MEDICINE

## 2024-02-01 PROCEDURE — 85025 COMPLETE CBC W/AUTO DIFF WBC: CPT | Performed by: EMERGENCY MEDICINE

## 2024-02-01 PROCEDURE — 94640 AIRWAY INHALATION TREATMENT: CPT | Mod: MUE

## 2024-02-01 PROCEDURE — 71045 X-RAY EXAM CHEST 1 VIEW: CPT | Performed by: RADIOLOGY

## 2024-02-01 PROCEDURE — 84484 ASSAY OF TROPONIN QUANT: CPT | Performed by: EMERGENCY MEDICINE

## 2024-02-01 RX ORDER — ALBUTEROL SULFATE 90 UG/1
2 AEROSOL, METERED RESPIRATORY (INHALATION) EVERY 4 HOURS PRN
COMMUNITY

## 2024-02-01 RX ORDER — IPRATROPIUM BROMIDE AND ALBUTEROL SULFATE 2.5; .5 MG/3ML; MG/3ML
SOLUTION RESPIRATORY (INHALATION)
Status: COMPLETED
Start: 2024-02-01 | End: 2024-02-01

## 2024-02-01 RX ORDER — GUAIFENESIN 600 MG/1
1200 TABLET, EXTENDED RELEASE ORAL 2 TIMES DAILY
COMMUNITY

## 2024-02-01 RX ORDER — OLANZAPINE 5 MG/1
5 TABLET ORAL NIGHTLY
COMMUNITY

## 2024-02-01 RX ORDER — PROMETHAZINE HYDROCHLORIDE 12.5 MG/1
12.5 SUPPOSITORY RECTAL EVERY 6 HOURS PRN
COMMUNITY

## 2024-02-01 RX ORDER — PROPYLENE GLYCOL 0.06 MG/ML
1 SOLUTION/ DROPS OPHTHALMIC 3 TIMES DAILY
COMMUNITY

## 2024-02-01 RX ORDER — IPRATROPIUM BROMIDE AND ALBUTEROL SULFATE 2.5; .5 MG/3ML; MG/3ML
3 SOLUTION RESPIRATORY (INHALATION) ONCE
Status: COMPLETED | OUTPATIENT
Start: 2024-02-01 | End: 2024-02-01

## 2024-02-01 RX ORDER — IPRATROPIUM BROMIDE AND ALBUTEROL SULFATE 2.5; .5 MG/3ML; MG/3ML
3 SOLUTION RESPIRATORY (INHALATION) EVERY 6 HOURS PRN
COMMUNITY

## 2024-02-01 RX ADMIN — IPRATROPIUM BROMIDE AND ALBUTEROL SULFATE 3 ML: 2.5; .5 SOLUTION RESPIRATORY (INHALATION) at 13:40

## 2024-02-01 RX ADMIN — Medication 4 L/MIN: at 13:42

## 2024-02-01 RX ADMIN — IPRATROPIUM BROMIDE AND ALBUTEROL SULFATE 3 ML: 2.5; .5 SOLUTION RESPIRATORY (INHALATION) at 18:43

## 2024-02-01 ASSESSMENT — PAIN DESCRIPTION - LOCATION: LOCATION: CHEST

## 2024-02-01 ASSESSMENT — COLUMBIA-SUICIDE SEVERITY RATING SCALE - C-SSRS
1. IN THE PAST MONTH, HAVE YOU WISHED YOU WERE DEAD OR WISHED YOU COULD GO TO SLEEP AND NOT WAKE UP?: NO
6. HAVE YOU EVER DONE ANYTHING, STARTED TO DO ANYTHING, OR PREPARED TO DO ANYTHING TO END YOUR LIFE?: NO
2. HAVE YOU ACTUALLY HAD ANY THOUGHTS OF KILLING YOURSELF?: NO

## 2024-02-01 ASSESSMENT — PAIN DESCRIPTION - PAIN TYPE: TYPE: ACUTE PAIN

## 2024-02-01 ASSESSMENT — PAIN DESCRIPTION - ORIENTATION: ORIENTATION: OTHER (COMMENT)

## 2024-02-01 ASSESSMENT — PAIN SCALES - GENERAL
PAINLEVEL_OUTOF10: 8

## 2024-02-01 ASSESSMENT — PAIN - FUNCTIONAL ASSESSMENT: PAIN_FUNCTIONAL_ASSESSMENT: 0-10

## 2024-02-01 ASSESSMENT — PAIN DESCRIPTION - PROGRESSION: CLINICAL_PROGRESSION: NOT CHANGED

## 2024-02-01 ASSESSMENT — PAIN DESCRIPTION - FREQUENCY: FREQUENCY: CONSTANT/CONTINUOUS

## 2024-02-01 ASSESSMENT — PAIN DESCRIPTION - DESCRIPTORS
DESCRIPTORS: DULL
DESCRIPTORS: DULL

## 2024-02-01 NOTE — ED PROVIDER NOTES
HPI   Chief Complaint   Patient presents with    Shortness of Breath     Recent discharge from hospital for same complaint. Dx with RSV. Was at home today became SOB. Had out patient CXR done. Neb tx prior to arrival. Oxygen was increased from home 3L to 4L per EMS.       Limitations to History: None    HPI: 83-year-old female recently discharged from the hospital secondary to respiratory failure and RSV.  Patient chronically on 3 L by nasal cannula.  Worsening shortness of breath today.  Outpatient x-ray showed pneumonia.  Denies any fever, chills, nausea, vomiting, abdominal pain, urinary symptoms.    Additional History Obtained from: EMS    ------------------------------------------------------------------------------------------------------------------------------------------  Physical Exam:    VS: As documented in the triage note and EMR flowsheet from this visit were reviewed.    Appearance: Alert. cooperative,  in no acute distress.   Skin: Intact,  dry skin, no lesions, rash, petechiae or purpura.   Eyes: PERRLA, EOMs intact,  Conjunctiva pink with no redness or exudates.   HENT: Normocephalic, atraumatic. Nares patent. No intraoral lesions.   Neck: Supple, without meningismus. Trachea at midline. No lymphadenopathy.  Pulmonary: Coarse bilaterally with good chest wall excursion. No rales, rhonchi or wheezing. No accessory muscle use or stridor.  Cardiac: Regular rate and rhythm, no rubs, murmurs, or gallops.   Abdomen: Abdomen is soft, nontender, and nondistended.  No palpable organomegaly.  No rebound or guarding.  No CVA tenderness. Nonsurgical abdomen  Genitourinary: Exam deferred.  Musculoskeletal: Full range of motion.  Pulses full and equal. No cyanosis, clubbing, or edema.  Psychiatric: Appropriate mood and affect.                            No data recorded                Patient History   Past Medical History:   Diagnosis Date    HTN (hypertension)     Pancreatic cancer (CMS/HCC)      Past Surgical  History:   Procedure Laterality Date    OTHER SURGICAL HISTORY  09/26/2019    Shoulder surgery    OTHER SURGICAL HISTORY  09/26/2019    Open reduction-internal fixation    OTHER SURGICAL HISTORY  09/26/2019    Hip surgery    OTHER SURGICAL HISTORY  09/26/2019    Tubal ligation    OTHER SURGICAL HISTORY  10/24/2022    Epidural steroid injection    OTHER SURGICAL HISTORY  03/02/2022    Arterial stent placement    OTHER SURGICAL HISTORY  03/21/2022    Intra-articular corticosteroid injection    OTHER SURGICAL HISTORY  03/02/2022    Foot surgery     Family History   Problem Relation Name Age of Onset    Hypertension Mother      Cancer Father      Cancer Brother       Social History     Tobacco Use    Smoking status: Every Day     Packs/day: .5     Types: Cigarettes    Smokeless tobacco: Not on file   Substance Use Topics    Alcohol use: Never    Drug use: Not on file       Physical Exam   ED Triage Vitals   Temp Pulse Resp BP   -- -- -- --      SpO2 Temp src Heart Rate Source Patient Position   -- -- -- --      BP Location FiO2 (%)     -- --       Physical Exam    ED Course & MDM   Diagnoses as of 02/01/24 1642   Shortness of breath   COPD exacerbation (CMS/[facility])       Medical Decision Making  Labs Reviewed  CBC WITH AUTO DIFFERENTIAL - Abnormal     WBC                           7.3                    nRBC                          0.0                    RBC                           3.57 (*)               Hemoglobin                    10.8 (*)               Hematocrit                    33.6 (*)               MCV                           94                     MCH                           30.3                   MCHC                          32.1                   RDW                           13.8                   Platelets                     280                    Neutrophils %                 80.9                   Immature Granulocytes %, Automated   0.4                    Lymphocytes %                 10.5                    Monocytes %                   7.2                    Eosinophils %                 0.6                    Basophils %                   0.4                    Neutrophils Absolute          5.88 (*)               Immature Granulocytes Absolute, Au*   0.03                   Lymphocytes Absolute          0.76 (*)               Monocytes Absolute            0.52                   Eosinophils Absolute          0.04                   Basophils Absolute            0.03                COMPREHENSIVE METABOLIC PANEL - Abnormal     Glucose                       99                     Sodium                        137                    Potassium                     3.9                    Chloride                      98                     Bicarbonate                   34 (*)                 Anion Gap                     9 (*)                  Urea Nitrogen                 14                     Creatinine                    0.50                   eGFR                          >90                    Calcium                       9.0                    Albumin                       3.3 (*)                Alkaline Phosphatase          91                     Total Protein                 6.3 (*)                AST                           11                     Bilirubin, Total              0.4                    ALT                           9                   TROPONIN I, HIGH SENSITIVITY - Abnormal     Troponin I, High Sensitivity   15 (*)                     Narrative: Less than 99th percentile of normal range cutoff-                  Female and children under 18 years old <14 ng/L; Male <21 ng/L: Negative                  Repeat testing should be performed if clinically indicated.                                     Female and children under 18 years old 14-50 ng/L; Male 21-50 ng/L:                  Consistent with possible cardiac damage and possible increased clinical                   risk. Serial measurements  may help to assess extent of myocardial damage.                                     >50 ng/L: Consistent with cardiac damage, increased clinical risk and                  myocardial infarction. Serial measurements may help assess extent of                   myocardial damage.                                      NOTE: Children less than 1 year old may have higher baseline troponin                   levels and results should be interpreted in conjunction with the overall                   clinical context.                                     NOTE: Troponin I testing is performed using a different                   testing methodology at [facility] than at other                   [facility]. Direct result comparisons should only                   be made within the same method.  TROPONIN I, HIGH SENSITIVITY - Normal     Troponin I, High Sensitivity   13                         Narrative: Less than 99th percentile of normal range cutoff-                  Female and children under 18 years old <14 ng/L; Male <21 ng/L: Negative                  Repeat testing should be performed if clinically indicated.                                     Female and children under 18 years old 14-50 ng/L; Male 21-50 ng/L:                  Consistent with possible cardiac damage and possible increased clinical                   risk. Serial measurements may help to assess extent of myocardial damage.                                     >50 ng/L: Consistent with cardiac damage, increased clinical risk and                  myocardial infarction. Serial measurements may help assess extent of                   myocardial damage.                                      NOTE: Children less than 1 year old may have higher baseline troponin                   levels and results should be interpreted in conjunction with the overall                   clinical context.                                     NOTE: Troponin I testing is  performed using a different                   testing methodology at [facility] than at other                   [facility]. Direct result comparisons should only                   be made within the same method.  GRAY TOP    XR chest 1 view   Final Result    Left upper lung airspace consolidation, as above. Clinical    correlation and continued follow-up until clearing is recommended.          MACRO:    None.          Signed by: Jeffery Aguilar 2/1/2024 1:47 PM    Dictation workstation:   [id number]     Medical Decision Making:    Patient appears well nontoxic.  Initially tachypneic.  Treated with a DuoNeb.  Patient maintaining oxygen saturations of 95 to 98% on her 3 L.  Chest x-ray shows continued chronic changes on chest x-ray.  Patient will be discharged back to the [facility] with plans on using her aerosols and following up with primary care.  Stable at time of discharge.    Differential Diagnoses Considered: Viral illness, pneumonia, ACS, pneumothorax, COPD    Chronic Medical Conditions Significantly Affecting Care: Oxygen dependent COPD    External Records Reviewed: I reviewed recent and relevant outside records including: Discharge summary from [place] detailing her recent admission for RSV and respiratory failure.    Independent Interpretation of Studies:  I independently interpreted: Chest x-ray shows no evidence of lobar pneumonia or pneumothorax.    Escalation of Care:  Appropriate for discharge and follow-up with primary care.            Procedure  ECG 12 lead    Performed by: Savage Chu DO  Authorized by: Savage Chu DO    ECG interpreted by ED Physician in the absence of a cardiologist: yes    Comments:      EKG interpreted by Dr. Savage Chu: Normal sinus rhythm at 75 bpm.  MD interval 128 ms.  QTc of 469 ms.  Left axis deviation.  Left bundle branch block.       Savage Chu DO  02/01/24 5825

## 2024-02-02 LAB
ATRIAL RATE: 75 BPM
P OFFSET: 206 MS
P ONSET: 152 MS
PR INTERVAL: 128 MS
Q ONSET: 216 MS
QRS COUNT: 12 BEATS
QRS DURATION: 126 MS
QT INTERVAL: 420 MS
QTC CALCULATION(BAZETT): 469 MS
QTC FREDERICIA: 452 MS
R AXIS: -43 DEGREES
T AXIS: 42 DEGREES
T OFFSET: 426 MS
VENTRICULAR RATE: 75 BPM

## 2024-02-13 ENCOUNTER — PATIENT OUTREACH (OUTPATIENT)
Dept: CARE COORDINATION | Facility: CLINIC | Age: 84
End: 2024-02-13
Payer: MEDICARE

## 2024-02-13 NOTE — PROGRESS NOTES
Unable to reach patient for call back after patient's follow up appointment with PCP.   MARY GRACEM with call back number for patient to call if needed   If no voicemail available call attempts x 2 were made to contact the patient to assist with any questions or concerns patient may have.

## 2024-07-14 ENCOUNTER — LAB REQUISITION (OUTPATIENT)
Dept: LAB | Facility: HOSPITAL | Age: 84
End: 2024-07-14
Payer: MEDICARE

## 2024-07-14 DIAGNOSIS — R30.0 DYSURIA: ICD-10-CM

## 2024-07-14 LAB
APPEARANCE UR: CLEAR
BILIRUB UR STRIP.AUTO-MCNC: NEGATIVE MG/DL
COLOR UR: YELLOW
GLUCOSE UR STRIP.AUTO-MCNC: NORMAL MG/DL
HYALINE CASTS #/AREA URNS AUTO: ABNORMAL /LPF
KETONES UR STRIP.AUTO-MCNC: NEGATIVE MG/DL
LEUKOCYTE ESTERASE UR QL STRIP.AUTO: ABNORMAL
MUCOUS THREADS #/AREA URNS AUTO: ABNORMAL /LPF
NITRITE UR QL STRIP.AUTO: NEGATIVE
PH UR STRIP.AUTO: 6.5 [PH]
PROT UR STRIP.AUTO-MCNC: ABNORMAL MG/DL
RBC # UR STRIP.AUTO: NEGATIVE /UL
RBC #/AREA URNS AUTO: ABNORMAL /HPF
SP GR UR STRIP.AUTO: 1.01
SQUAMOUS #/AREA URNS AUTO: ABNORMAL /HPF
TRANS CELLS #/AREA UR COMP ASSIST: ABNORMAL /HPF
UROBILINOGEN UR STRIP.AUTO-MCNC: ABNORMAL MG/DL
WBC #/AREA URNS AUTO: ABNORMAL /HPF
WBC CLUMPS #/AREA URNS AUTO: ABNORMAL /HPF

## 2024-07-14 PROCEDURE — 81001 URINALYSIS AUTO W/SCOPE: CPT | Mod: OUT | Performed by: FAMILY MEDICINE

## 2024-07-14 PROCEDURE — 87186 SC STD MICRODIL/AGAR DIL: CPT | Mod: OUT,SAMLAB | Performed by: FAMILY MEDICINE

## 2024-07-17 LAB — BACTERIA UR CULT: ABNORMAL

## 2024-07-24 ENCOUNTER — APPOINTMENT (OUTPATIENT)
Dept: RADIOLOGY | Facility: HOSPITAL | Age: 84
DRG: 193 | End: 2024-07-24
Payer: MEDICARE

## 2024-07-24 ENCOUNTER — APPOINTMENT (OUTPATIENT)
Dept: CARDIOLOGY | Facility: HOSPITAL | Age: 84
DRG: 193 | End: 2024-07-24
Payer: MEDICARE

## 2024-07-24 ENCOUNTER — HOSPITAL ENCOUNTER (INPATIENT)
Facility: HOSPITAL | Age: 84
LOS: 3 days | Discharge: NURSING, REHAB OR LONG-TERM CARE | End: 2024-07-28
Attending: EMERGENCY MEDICINE | Admitting: STUDENT IN AN ORGANIZED HEALTH CARE EDUCATION/TRAINING PROGRAM
Payer: MEDICARE

## 2024-07-24 DIAGNOSIS — E87.6 HYPOKALEMIA: ICD-10-CM

## 2024-07-24 DIAGNOSIS — J18.9 PNEUMONIA OF LEFT UPPER LOBE DUE TO INFECTIOUS ORGANISM: ICD-10-CM

## 2024-07-24 DIAGNOSIS — R29.6 FREQUENT FALLS: ICD-10-CM

## 2024-07-24 DIAGNOSIS — R53.1 WEAKNESS: Primary | ICD-10-CM

## 2024-07-24 DIAGNOSIS — S09.90XA CLOSED HEAD INJURY, INITIAL ENCOUNTER: ICD-10-CM

## 2024-07-24 LAB
ALBUMIN SERPL BCP-MCNC: 2.7 G/DL (ref 3.4–5)
ALP SERPL-CCNC: 673 U/L (ref 33–136)
ALT SERPL W P-5'-P-CCNC: 210 U/L (ref 7–45)
ANION GAP SERPL CALC-SCNC: 11 MMOL/L (ref 10–20)
APPEARANCE UR: CLEAR
AST SERPL W P-5'-P-CCNC: 487 U/L (ref 9–39)
BASOPHILS # BLD AUTO: 0.01 X10*3/UL (ref 0–0.1)
BASOPHILS NFR BLD AUTO: 0.1 %
BILIRUB SERPL-MCNC: 0.9 MG/DL (ref 0–1.2)
BILIRUB UR STRIP.AUTO-MCNC: NEGATIVE MG/DL
BUN SERPL-MCNC: 13 MG/DL (ref 6–23)
CALCIUM SERPL-MCNC: 8.1 MG/DL (ref 8.6–10.3)
CARDIAC TROPONIN I PNL SERPL HS: 37 NG/L (ref 0–13)
CHLORIDE SERPL-SCNC: 97 MMOL/L (ref 98–107)
CO2 SERPL-SCNC: 28 MMOL/L (ref 21–32)
COLOR UR: YELLOW
CREAT SERPL-MCNC: 0.46 MG/DL (ref 0.5–1.05)
EGFRCR SERPLBLD CKD-EPI 2021: >90 ML/MIN/1.73M*2
EOSINOPHIL # BLD AUTO: 0.01 X10*3/UL (ref 0–0.4)
EOSINOPHIL NFR BLD AUTO: 0.1 %
ERYTHROCYTE [DISTWIDTH] IN BLOOD BY AUTOMATED COUNT: 14 % (ref 11.5–14.5)
GLUCOSE SERPL-MCNC: 97 MG/DL (ref 74–99)
GLUCOSE UR STRIP.AUTO-MCNC: NORMAL MG/DL
HCT VFR BLD AUTO: 32.5 % (ref 36–46)
HGB BLD-MCNC: 10.7 G/DL (ref 12–16)
HOLD SPECIMEN: NORMAL
IMM GRANULOCYTES # BLD AUTO: 0.04 X10*3/UL (ref 0–0.5)
IMM GRANULOCYTES NFR BLD AUTO: 0.3 % (ref 0–0.9)
KETONES UR STRIP.AUTO-MCNC: NEGATIVE MG/DL
LEUKOCYTE ESTERASE UR QL STRIP.AUTO: NEGATIVE
LIPASE SERPL-CCNC: 154 U/L (ref 9–82)
LYMPHOCYTES # BLD AUTO: 0.13 X10*3/UL (ref 0.8–3)
LYMPHOCYTES NFR BLD AUTO: 1 %
MCH RBC QN AUTO: 30 PG (ref 26–34)
MCHC RBC AUTO-ENTMCNC: 32.9 G/DL (ref 32–36)
MCV RBC AUTO: 91 FL (ref 80–100)
MONOCYTES # BLD AUTO: 0.05 X10*3/UL (ref 0.05–0.8)
MONOCYTES NFR BLD AUTO: 0.4 %
NEUTROPHILS # BLD AUTO: 12.23 X10*3/UL (ref 1.6–5.5)
NEUTROPHILS NFR BLD AUTO: 98.1 %
NITRITE UR QL STRIP.AUTO: NEGATIVE
NRBC BLD-RTO: 0 /100 WBCS (ref 0–0)
PH UR STRIP.AUTO: 7.5 [PH]
PLATELET # BLD AUTO: 269 X10*3/UL (ref 150–450)
POTASSIUM SERPL-SCNC: 3.2 MMOL/L (ref 3.5–5.3)
PROT SERPL-MCNC: 5.5 G/DL (ref 6.4–8.2)
PROT UR STRIP.AUTO-MCNC: NEGATIVE MG/DL
RBC # BLD AUTO: 3.57 X10*6/UL (ref 4–5.2)
RBC # UR STRIP.AUTO: NEGATIVE /UL
SODIUM SERPL-SCNC: 133 MMOL/L (ref 136–145)
SP GR UR STRIP.AUTO: 1.03
UROBILINOGEN UR STRIP.AUTO-MCNC: ABNORMAL MG/DL
WBC # BLD AUTO: 12.5 X10*3/UL (ref 4.4–11.3)

## 2024-07-24 PROCEDURE — 72125 CT NECK SPINE W/O DYE: CPT | Performed by: RADIOLOGY

## 2024-07-24 PROCEDURE — 2550000001 HC RX 255 CONTRASTS: Performed by: EMERGENCY MEDICINE

## 2024-07-24 PROCEDURE — G0378 HOSPITAL OBSERVATION PER HR: HCPCS

## 2024-07-24 PROCEDURE — 2500000005 HC RX 250 GENERAL PHARMACY W/O HCPCS: Performed by: NURSE PRACTITIONER

## 2024-07-24 PROCEDURE — 93005 ELECTROCARDIOGRAM TRACING: CPT

## 2024-07-24 PROCEDURE — 96365 THER/PROPH/DIAG IV INF INIT: CPT | Mod: 59

## 2024-07-24 PROCEDURE — 2500000002 HC RX 250 W HCPCS SELF ADMINISTERED DRUGS (ALT 637 FOR MEDICARE OP, ALT 636 FOR OP/ED): Performed by: NURSE PRACTITIONER

## 2024-07-24 PROCEDURE — 81003 URINALYSIS AUTO W/O SCOPE: CPT | Performed by: EMERGENCY MEDICINE

## 2024-07-24 PROCEDURE — 71260 CT THORAX DX C+: CPT | Performed by: STUDENT IN AN ORGANIZED HEALTH CARE EDUCATION/TRAINING PROGRAM

## 2024-07-24 PROCEDURE — 80053 COMPREHEN METABOLIC PANEL: CPT | Performed by: EMERGENCY MEDICINE

## 2024-07-24 PROCEDURE — 36415 COLL VENOUS BLD VENIPUNCTURE: CPT | Performed by: EMERGENCY MEDICINE

## 2024-07-24 PROCEDURE — 84484 ASSAY OF TROPONIN QUANT: CPT | Performed by: EMERGENCY MEDICINE

## 2024-07-24 PROCEDURE — 72125 CT NECK SPINE W/O DYE: CPT

## 2024-07-24 PROCEDURE — 96361 HYDRATE IV INFUSION ADD-ON: CPT

## 2024-07-24 PROCEDURE — 74177 CT ABD & PELVIS W/CONTRAST: CPT

## 2024-07-24 PROCEDURE — 96372 THER/PROPH/DIAG INJ SC/IM: CPT | Performed by: NURSE PRACTITIONER

## 2024-07-24 PROCEDURE — 70450 CT HEAD/BRAIN W/O DYE: CPT

## 2024-07-24 PROCEDURE — 2500000005 HC RX 250 GENERAL PHARMACY W/O HCPCS: Performed by: EMERGENCY MEDICINE

## 2024-07-24 PROCEDURE — 94640 AIRWAY INHALATION TREATMENT: CPT

## 2024-07-24 PROCEDURE — 2500000004 HC RX 250 GENERAL PHARMACY W/ HCPCS (ALT 636 FOR OP/ED): Performed by: NURSE PRACTITIONER

## 2024-07-24 PROCEDURE — 94664 DEMO&/EVAL PT USE INHALER: CPT

## 2024-07-24 PROCEDURE — 85025 COMPLETE CBC W/AUTO DIFF WBC: CPT | Performed by: EMERGENCY MEDICINE

## 2024-07-24 PROCEDURE — 70450 CT HEAD/BRAIN W/O DYE: CPT | Performed by: RADIOLOGY

## 2024-07-24 PROCEDURE — 94760 N-INVAS EAR/PLS OXIMETRY 1: CPT

## 2024-07-24 PROCEDURE — 83690 ASSAY OF LIPASE: CPT | Performed by: EMERGENCY MEDICINE

## 2024-07-24 PROCEDURE — 2500000001 HC RX 250 WO HCPCS SELF ADMINISTERED DRUGS (ALT 637 FOR MEDICARE OP): Performed by: NURSE PRACTITIONER

## 2024-07-24 PROCEDURE — 96366 THER/PROPH/DIAG IV INF ADDON: CPT

## 2024-07-24 PROCEDURE — 94761 N-INVAS EAR/PLS OXIMETRY MLT: CPT

## 2024-07-24 PROCEDURE — 74177 CT ABD & PELVIS W/CONTRAST: CPT | Performed by: STUDENT IN AN ORGANIZED HEALTH CARE EDUCATION/TRAINING PROGRAM

## 2024-07-24 PROCEDURE — 2500000004 HC RX 250 GENERAL PHARMACY W/ HCPCS (ALT 636 FOR OP/ED): Performed by: EMERGENCY MEDICINE

## 2024-07-24 PROCEDURE — 99285 EMERGENCY DEPT VISIT HI MDM: CPT | Mod: 25

## 2024-07-24 PROCEDURE — 9420000001 HC RT PATIENT EDUCATION 5 MIN

## 2024-07-24 RX ORDER — ALUMINUM HYDROXIDE, MAGNESIUM HYDROXIDE, AND SIMETHICONE 1200; 120; 1200 MG/30ML; MG/30ML; MG/30ML
20 SUSPENSION ORAL EVERY 4 HOURS PRN
Status: DISCONTINUED | OUTPATIENT
Start: 2024-07-24 | End: 2024-07-28 | Stop reason: HOSPADM

## 2024-07-24 RX ORDER — IBUPROFEN 200 MG
1 TABLET ORAL
COMMUNITY

## 2024-07-24 RX ORDER — ONDANSETRON 4 MG/1
4 TABLET, ORALLY DISINTEGRATING ORAL EVERY 6 HOURS PRN
Status: DISCONTINUED | OUTPATIENT
Start: 2024-07-24 | End: 2024-07-28 | Stop reason: HOSPADM

## 2024-07-24 RX ORDER — ENOXAPARIN SODIUM 100 MG/ML
40 INJECTION SUBCUTANEOUS EVERY 24 HOURS
Status: DISCONTINUED | OUTPATIENT
Start: 2024-07-24 | End: 2024-07-28 | Stop reason: HOSPADM

## 2024-07-24 RX ORDER — PANTOPRAZOLE SODIUM 40 MG/1
40 TABLET, DELAYED RELEASE ORAL
Status: DISCONTINUED | OUTPATIENT
Start: 2024-07-25 | End: 2024-07-28 | Stop reason: HOSPADM

## 2024-07-24 RX ORDER — PREDNISONE 5 MG/1
1 TABLET ORAL DAILY
COMMUNITY

## 2024-07-24 RX ORDER — LORAZEPAM 0.5 MG/1
0.5 TABLET ORAL EVERY 4 HOURS PRN
Status: DISCONTINUED | OUTPATIENT
Start: 2024-07-24 | End: 2024-07-28 | Stop reason: HOSPADM

## 2024-07-24 RX ORDER — AMOXICILLIN 250 MG
2 CAPSULE ORAL DAILY PRN
Status: ON HOLD | COMMUNITY
End: 2024-07-24

## 2024-07-24 RX ORDER — BUPROPION HYDROCHLORIDE 150 MG/1
1 TABLET ORAL DAILY
COMMUNITY

## 2024-07-24 RX ORDER — ALPRAZOLAM 0.25 MG/1
0.25 TABLET ORAL EVERY 6 HOURS PRN
Status: DISCONTINUED | OUTPATIENT
Start: 2024-07-24 | End: 2024-07-24

## 2024-07-24 RX ORDER — AMOXICILLIN 250 MG
1 CAPSULE ORAL DAILY
COMMUNITY

## 2024-07-24 RX ORDER — GUAIFENESIN 1200 MG/1
1200 TABLET, EXTENDED RELEASE ORAL 2 TIMES DAILY
Status: DISCONTINUED | OUTPATIENT
Start: 2024-07-24 | End: 2024-07-28 | Stop reason: HOSPADM

## 2024-07-24 RX ORDER — IPRATROPIUM BROMIDE AND ALBUTEROL SULFATE 2.5; .5 MG/3ML; MG/3ML
3 SOLUTION RESPIRATORY (INHALATION) EVERY 6 HOURS PRN
Status: DISCONTINUED | OUTPATIENT
Start: 2024-07-24 | End: 2024-07-28 | Stop reason: HOSPADM

## 2024-07-24 RX ORDER — MORPHINE SULFATE ORAL SOLUTION 10 MG/5ML
15 SOLUTION ORAL EVERY 6 HOURS PRN
Status: DISCONTINUED | OUTPATIENT
Start: 2024-07-24 | End: 2024-07-28 | Stop reason: HOSPADM

## 2024-07-24 RX ORDER — BISACODYL 10 MG/1
1 SUPPOSITORY RECTAL
COMMUNITY
End: 2024-07-28 | Stop reason: HOSPADM

## 2024-07-24 RX ORDER — ALPRAZOLAM 0.25 MG/1
0.25 TABLET ORAL EVERY 6 HOURS PRN
COMMUNITY
End: 2024-07-28 | Stop reason: HOSPADM

## 2024-07-24 RX ORDER — ALUMINUM HYDROXIDE, MAGNESIUM HYDROXIDE, AND SIMETHICONE 2400; 240; 2400 MG/30ML; MG/30ML; MG/30ML
20 SUSPENSION ORAL EVERY 4 HOURS PRN
COMMUNITY

## 2024-07-24 RX ORDER — AMOXICILLIN 250 MG
1 CAPSULE ORAL DAILY
Status: DISCONTINUED | OUTPATIENT
Start: 2024-07-24 | End: 2024-07-28 | Stop reason: HOSPADM

## 2024-07-24 RX ORDER — POLYETHYLENE GLYCOL 3350 17 G/17G
17 POWDER, FOR SOLUTION ORAL DAILY
Status: DISCONTINUED | OUTPATIENT
Start: 2024-07-24 | End: 2024-07-24

## 2024-07-24 RX ORDER — HALOPERIDOL 1 MG/1
1 TABLET ORAL 4 TIMES DAILY PRN
Status: DISCONTINUED | OUTPATIENT
Start: 2024-07-24 | End: 2024-07-28 | Stop reason: HOSPADM

## 2024-07-24 RX ORDER — MAGNESIUM HYDROXIDE 2400 MG/10ML
10 SUSPENSION ORAL DAILY PRN
Status: DISCONTINUED | OUTPATIENT
Start: 2024-07-24 | End: 2024-07-28 | Stop reason: HOSPADM

## 2024-07-24 RX ORDER — OMEPRAZOLE 20 MG/1
1 CAPSULE, DELAYED RELEASE ORAL DAILY
COMMUNITY

## 2024-07-24 RX ORDER — MIRTAZAPINE 15 MG/1
15 TABLET, FILM COATED ORAL NIGHTLY
Status: DISCONTINUED | OUTPATIENT
Start: 2024-07-24 | End: 2024-07-28 | Stop reason: HOSPADM

## 2024-07-24 RX ORDER — HALOPERIDOL 1 MG/1
1 TABLET ORAL 4 TIMES DAILY PRN
COMMUNITY

## 2024-07-24 RX ORDER — MORPHINE SULFATE 15 MG/1
15 TABLET, FILM COATED, EXTENDED RELEASE ORAL EVERY 12 HOURS SCHEDULED
Status: DISCONTINUED | OUTPATIENT
Start: 2024-07-24 | End: 2024-07-28 | Stop reason: HOSPADM

## 2024-07-24 RX ORDER — PREDNISONE 5 MG/1
5 TABLET ORAL DAILY
Status: DISCONTINUED | OUTPATIENT
Start: 2024-07-24 | End: 2024-07-28 | Stop reason: HOSPADM

## 2024-07-24 RX ORDER — POTASSIUM CHLORIDE 14.9 MG/ML
20 INJECTION INTRAVENOUS
Status: DISCONTINUED | OUTPATIENT
Start: 2024-07-24 | End: 2024-07-24

## 2024-07-24 RX ORDER — BISACODYL 10 MG/1
10 SUPPOSITORY RECTAL
Status: DISCONTINUED | OUTPATIENT
Start: 2024-07-24 | End: 2024-07-28 | Stop reason: HOSPADM

## 2024-07-24 RX ORDER — BUPROPION HYDROCHLORIDE 150 MG/1
150 TABLET ORAL DAILY
Status: DISCONTINUED | OUTPATIENT
Start: 2024-07-24 | End: 2024-07-28 | Stop reason: HOSPADM

## 2024-07-24 RX ORDER — LORAZEPAM 0.5 MG/1
0.5 TABLET ORAL EVERY 4 HOURS PRN
COMMUNITY

## 2024-07-24 RX ORDER — MORPHINE SULFATE 20 MG/5ML
0.25 SOLUTION ORAL
COMMUNITY

## 2024-07-24 RX ORDER — POLYETHYLENE GLYCOL 3350 17 G/17G
17 POWDER, FOR SOLUTION ORAL DAILY
Status: DISCONTINUED | OUTPATIENT
Start: 2024-07-24 | End: 2024-07-28 | Stop reason: HOSPADM

## 2024-07-24 RX ORDER — MIRTAZAPINE 15 MG/1
1 TABLET, FILM COATED ORAL NIGHTLY
COMMUNITY

## 2024-07-24 RX ORDER — POTASSIUM CHLORIDE 20 MEQ/1
20 TABLET, EXTENDED RELEASE ORAL ONCE
Status: COMPLETED | OUTPATIENT
Start: 2024-07-24 | End: 2024-07-24

## 2024-07-24 RX ORDER — BISACODYL 5 MG
5 TABLET, DELAYED RELEASE (ENTERIC COATED) ORAL DAILY PRN
Status: DISCONTINUED | OUTPATIENT
Start: 2024-07-24 | End: 2024-07-28 | Stop reason: HOSPADM

## 2024-07-24 SDOH — ECONOMIC STABILITY: INCOME INSECURITY
HOW HARD IS IT FOR YOU TO PAY FOR THE VERY BASICS LIKE FOOD, HOUSING, MEDICAL CARE, AND HEATING?: PATIENT UNABLE TO ANSWER

## 2024-07-24 SDOH — HEALTH STABILITY: MENTAL HEALTH: HOW OFTEN DO YOU HAVE A DRINK CONTAINING ALCOHOL?: PATIENT UNABLE TO ANSWER

## 2024-07-24 SDOH — SOCIAL STABILITY: SOCIAL INSECURITY
WITHIN THE LAST YEAR, HAVE TO BEEN RAPED OR FORCED TO HAVE ANY KIND OF SEXUAL ACTIVITY BY YOUR PARTNER OR EX-PARTNER?: PATIENT UNABLE TO ANSWER

## 2024-07-24 SDOH — SOCIAL STABILITY: SOCIAL NETWORK: HOW OFTEN DO YOU ATTENT MEETINGS OF THE CLUB OR ORGANIZATION YOU BELONG TO?: PATIENT UNABLE TO ANSWER

## 2024-07-24 SDOH — SOCIAL STABILITY: SOCIAL INSECURITY: DO YOU FEEL UNSAFE GOING BACK TO THE PLACE WHERE YOU ARE LIVING?: NO

## 2024-07-24 SDOH — ECONOMIC STABILITY: INCOME INSECURITY
IN THE LAST 12 MONTHS, WAS THERE A TIME WHEN YOU WERE NOT ABLE TO PAY THE MORTGAGE OR RENT ON TIME?: PATIENT UNABLE TO ANSWER

## 2024-07-24 SDOH — HEALTH STABILITY: MENTAL HEALTH
HOW OFTEN DO YOU NEED TO HAVE SOMEONE HELP YOU WHEN YOU READ INSTRUCTIONS, PAMPHLETS, OR OTHER WRITTEN MATERIAL FROM YOUR DOCTOR OR PHARMACY?: PATIENT UNABLE TO RESPOND

## 2024-07-24 SDOH — HEALTH STABILITY: PHYSICAL HEALTH
ON AVERAGE, HOW MANY DAYS PER WEEK DO YOU ENGAGE IN MODERATE TO STRENUOUS EXERCISE (LIKE A BRISK WALK)?: PATIENT UNABLE TO ANSWER

## 2024-07-24 SDOH — SOCIAL STABILITY: SOCIAL INSECURITY: WITHIN THE LAST YEAR, HAVE YOU BEEN AFRAID OF YOUR PARTNER OR EX-PARTNER?: PATIENT UNABLE TO ANSWER

## 2024-07-24 SDOH — SOCIAL STABILITY: SOCIAL INSECURITY: HAVE YOU HAD ANY THOUGHTS OF HARMING ANYONE ELSE?: NO

## 2024-07-24 SDOH — SOCIAL STABILITY: SOCIAL INSECURITY: DO YOU FEEL ANYONE HAS EXPLOITED OR TAKEN ADVANTAGE OF YOU FINANCIALLY OR OF YOUR PERSONAL PROPERTY?: NO

## 2024-07-24 SDOH — ECONOMIC STABILITY: TRANSPORTATION INSECURITY
IN THE PAST 12 MONTHS, HAS THE LACK OF TRANSPORTATION KEPT YOU FROM MEDICAL APPOINTMENTS OR FROM GETTING MEDICATIONS?: PATIENT UNABLE TO ANSWER

## 2024-07-24 SDOH — SOCIAL STABILITY: SOCIAL INSECURITY
WITHIN THE LAST YEAR, HAVE YOU BEEN KICKED, HIT, SLAPPED, OR OTHERWISE PHYSICALLY HURT BY YOUR PARTNER OR EX-PARTNER?: PATIENT UNABLE TO ANSWER

## 2024-07-24 SDOH — ECONOMIC STABILITY: HOUSING INSECURITY: IN THE PAST 12 MONTHS, HOW MANY TIMES HAVE YOU MOVED WHERE YOU WERE LIVING?: 0

## 2024-07-24 SDOH — HEALTH STABILITY: MENTAL HEALTH
STRESS IS WHEN SOMEONE FEELS TENSE, NERVOUS, ANXIOUS, OR CAN'T SLEEP AT NIGHT BECAUSE THEIR MIND IS TROUBLED. HOW STRESSED ARE YOU?: PATIENT UNABLE TO ANSWER

## 2024-07-24 SDOH — SOCIAL STABILITY: SOCIAL INSECURITY: DOES ANYONE TRY TO KEEP YOU FROM HAVING/CONTACTING OTHER FRIENDS OR DOING THINGS OUTSIDE YOUR HOME?: NO

## 2024-07-24 SDOH — SOCIAL STABILITY: SOCIAL INSECURITY: ARE THERE ANY APPARENT SIGNS OF INJURIES/BEHAVIORS THAT COULD BE RELATED TO ABUSE/NEGLECT?: NO

## 2024-07-24 SDOH — HEALTH STABILITY: MENTAL HEALTH: HOW MANY STANDARD DRINKS CONTAINING ALCOHOL DO YOU HAVE ON A TYPICAL DAY?: PATIENT UNABLE TO ANSWER

## 2024-07-24 SDOH — ECONOMIC STABILITY: FOOD INSECURITY
WITHIN THE PAST 12 MONTHS, THE FOOD YOU BOUGHT JUST DIDN'T LAST AND YOU DIDN'T HAVE MONEY TO GET MORE.: PATIENT UNABLE TO ANSWER

## 2024-07-24 SDOH — SOCIAL STABILITY: SOCIAL NETWORK: HOW OFTEN DO YOU ATTEND CHURCH OR RELIGIOUS SERVICES?: PATIENT UNABLE TO ANSWER

## 2024-07-24 SDOH — SOCIAL STABILITY: SOCIAL NETWORK
DO YOU BELONG TO ANY CLUBS OR ORGANIZATIONS SUCH AS CHURCH GROUPS UNIONS, FRATERNAL OR ATHLETIC GROUPS, OR SCHOOL GROUPS?: PATIENT UNABLE TO ANSWER

## 2024-07-24 SDOH — ECONOMIC STABILITY: HOUSING INSECURITY: AT ANY TIME IN THE PAST 12 MONTHS, WERE YOU HOMELESS OR LIVING IN A SHELTER (INCLUDING NOW)?: PATIENT UNABLE TO ANSWER

## 2024-07-24 SDOH — SOCIAL STABILITY: SOCIAL INSECURITY: ABUSE: ADULT

## 2024-07-24 SDOH — SOCIAL STABILITY: SOCIAL INSECURITY: HAS ANYONE EVER THREATENED TO HURT YOUR FAMILY OR YOUR PETS?: NO

## 2024-07-24 SDOH — SOCIAL STABILITY: SOCIAL INSECURITY
WITHIN THE LAST YEAR, HAVE YOU BEEN HUMILIATED OR EMOTIONALLY ABUSED IN OTHER WAYS BY YOUR PARTNER OR EX-PARTNER?: PATIENT UNABLE TO ANSWER

## 2024-07-24 SDOH — SOCIAL STABILITY: SOCIAL NETWORK: HOW OFTEN DO YOU GET TOGETHER WITH FRIENDS OR RELATIVES?: PATIENT UNABLE TO ANSWER

## 2024-07-24 SDOH — HEALTH STABILITY: MENTAL HEALTH: HOW OFTEN DO YOU HAVE 6 OR MORE DRINKS ON ONE OCCASION?: PATIENT UNABLE TO ANSWER

## 2024-07-24 SDOH — SOCIAL STABILITY: SOCIAL INSECURITY: WERE YOU ABLE TO COMPLETE ALL THE BEHAVIORAL HEALTH SCREENINGS?: YES

## 2024-07-24 SDOH — SOCIAL STABILITY: SOCIAL NETWORK: IN A TYPICAL WEEK, HOW MANY TIMES DO YOU TALK ON THE PHONE WITH FAMILY, FRIENDS, OR NEIGHBORS?: PATIENT UNABLE TO ANSWER

## 2024-07-24 SDOH — SOCIAL STABILITY: SOCIAL INSECURITY: ARE YOU OR HAVE YOU BEEN THREATENED OR ABUSED PHYSICALLY, EMOTIONALLY, OR SEXUALLY BY ANYONE?: NO

## 2024-07-24 SDOH — ECONOMIC STABILITY: TRANSPORTATION INSECURITY
IN THE PAST 12 MONTHS, HAS LACK OF TRANSPORTATION KEPT YOU FROM MEETINGS, WORK, OR FROM GETTING THINGS NEEDED FOR DAILY LIVING?: PATIENT UNABLE TO ANSWER

## 2024-07-24 SDOH — SOCIAL STABILITY: SOCIAL NETWORK: ARE YOU MARRIED, WIDOWED, DIVORCED, SEPARATED, NEVER MARRIED, OR LIVING WITH A PARTNER?: PATIENT UNABLE TO ANSWER

## 2024-07-24 SDOH — SOCIAL STABILITY: SOCIAL INSECURITY: HAVE YOU HAD THOUGHTS OF HARMING ANYONE ELSE?: NO

## 2024-07-24 SDOH — ECONOMIC STABILITY: INCOME INSECURITY
IN THE PAST 12 MONTHS, HAS THE ELECTRIC, GAS, OIL, OR WATER COMPANY THREATENED TO SHUT OFF SERVICE IN YOUR HOME?: PATIENT UNABLE TO ANSWER

## 2024-07-24 SDOH — HEALTH STABILITY: PHYSICAL HEALTH: ON AVERAGE, HOW MANY MINUTES DO YOU ENGAGE IN EXERCISE AT THIS LEVEL?: PATIENT UNABLE TO ANSWER

## 2024-07-24 SDOH — ECONOMIC STABILITY: FOOD INSECURITY
WITHIN THE PAST 12 MONTHS, YOU WORRIED THAT YOUR FOOD WOULD RUN OUT BEFORE YOU GOT MONEY TO BUY MORE.: PATIENT UNABLE TO ANSWER

## 2024-07-24 ASSESSMENT — ACTIVITIES OF DAILY LIVING (ADL)
ASSISTIVE_DEVICE: WALKER
DRESSING YOURSELF: NEEDS ASSISTANCE
BATHING: NEEDS ASSISTANCE
LACK_OF_TRANSPORTATION: NO
HEARING - LEFT EAR: DIFFICULTY WITH NOISE
ADEQUATE_TO_COMPLETE_ADL: YES
PATIENT'S MEMORY ADEQUATE TO SAFELY COMPLETE DAILY ACTIVITIES?: YES
LACK_OF_TRANSPORTATION: PATIENT UNABLE TO ANSWER
LACK_OF_TRANSPORTATION: PATIENT UNABLE TO ANSWER
WALKS IN HOME: NEEDS ASSISTANCE
JUDGMENT_ADEQUATE_SAFELY_COMPLETE_DAILY_ACTIVITIES: UNABLE TO ASSESS
FEEDING YOURSELF: NEEDS ASSISTANCE
GROOMING: NEEDS ASSISTANCE
TOILETING: NEEDS ASSISTANCE
HEARING - RIGHT EAR: DIFFICULTY WITH NOISE

## 2024-07-24 ASSESSMENT — LIFESTYLE VARIABLES
AUDIT-C TOTAL SCORE: -1
AUDIT-C TOTAL SCORE: -1
HOW OFTEN DO YOU HAVE 6 OR MORE DRINKS ON ONE OCCASION: PATIENT DECLINED
SKIP TO QUESTIONS 9-10: 0
SKIP TO QUESTIONS 9-10: 0
HOW MANY STANDARD DRINKS CONTAINING ALCOHOL DO YOU HAVE ON A TYPICAL DAY: PATIENT DECLINED
HOW OFTEN DO YOU HAVE A DRINK CONTAINING ALCOHOL: PATIENT DECLINED
AUDIT-C TOTAL SCORE: -1

## 2024-07-24 ASSESSMENT — COGNITIVE AND FUNCTIONAL STATUS - GENERAL
TURNING FROM BACK TO SIDE WHILE IN FLAT BAD: A LOT
EATING MEALS: A LITTLE
MOBILITY SCORE: 12
DRESSING REGULAR LOWER BODY CLOTHING: A LOT
DAILY ACTIVITIY SCORE: 13
MOVING FROM LYING ON BACK TO SITTING ON SIDE OF FLAT BED WITH BEDRAILS: A LITTLE
MOVING FROM LYING ON BACK TO SITTING ON SIDE OF FLAT BED WITH BEDRAILS: A LOT
DAILY ACTIVITIY SCORE: 13
MOVING TO AND FROM BED TO CHAIR: A LOT
WALKING IN HOSPITAL ROOM: A LOT
EATING MEALS: A LITTLE
HELP NEEDED FOR BATHING: A LOT
TOILETING: A LOT
WALKING IN HOSPITAL ROOM: A LOT
PERSONAL GROOMING: A LOT
DRESSING REGULAR UPPER BODY CLOTHING: A LOT
STANDING UP FROM CHAIR USING ARMS: A LOT
DRESSING REGULAR UPPER BODY CLOTHING: A LOT
DRESSING REGULAR LOWER BODY CLOTHING: A LOT
CLIMB 3 TO 5 STEPS WITH RAILING: A LOT
TURNING FROM BACK TO SIDE WHILE IN FLAT BAD: A LOT
MOBILITY SCORE: 13
STANDING UP FROM CHAIR USING ARMS: A LOT
TOILETING: A LOT
PATIENT BASELINE BEDBOUND: NO
MOVING TO AND FROM BED TO CHAIR: A LOT
CLIMB 3 TO 5 STEPS WITH RAILING: A LOT
PERSONAL GROOMING: A LOT
HELP NEEDED FOR BATHING: A LOT

## 2024-07-24 ASSESSMENT — COLUMBIA-SUICIDE SEVERITY RATING SCALE - C-SSRS
2. HAVE YOU ACTUALLY HAD ANY THOUGHTS OF KILLING YOURSELF?: NO
1. IN THE PAST MONTH, HAVE YOU WISHED YOU WERE DEAD OR WISHED YOU COULD GO TO SLEEP AND NOT WAKE UP?: NO
6. HAVE YOU EVER DONE ANYTHING, STARTED TO DO ANYTHING, OR PREPARED TO DO ANYTHING TO END YOUR LIFE?: NO

## 2024-07-24 ASSESSMENT — PAIN DESCRIPTION - LOCATION: LOCATION: ABDOMEN

## 2024-07-24 ASSESSMENT — PAIN SCALES - GENERAL
PAINLEVEL_OUTOF10: 0 - NO PAIN
PAINLEVEL_OUTOF10: 0 - NO PAIN
PAINLEVEL_OUTOF10: 4

## 2024-07-24 ASSESSMENT — PAIN - FUNCTIONAL ASSESSMENT
PAIN_FUNCTIONAL_ASSESSMENT: 0-10

## 2024-07-24 ASSESSMENT — PATIENT HEALTH QUESTIONNAIRE - PHQ9
1. LITTLE INTEREST OR PLEASURE IN DOING THINGS: SEVERAL DAYS
SUM OF ALL RESPONSES TO PHQ9 QUESTIONS 1 & 2: 2
2. FEELING DOWN, DEPRESSED OR HOPELESS: SEVERAL DAYS

## 2024-07-24 NOTE — ED PROVIDER NOTES
HPI   No chief complaint on file.      Limitations to History: Altered mental status    HPI: 83-year-old female presents with concern for fall from local group home.  Patient found down.  Last known well 2 hours prior to arrival.  Patient complaining of abdominal pain.  Has no other complaints but is difficult to obtain a history.    Additional History Obtained from: EMS.  Daughter by phone.    ------------------------------------------------------------------------------------------------------------------------------------------  Physical Exam:    VS: As documented in the triage note and EMR flowsheet from this visit were reviewed.    Appearance: Alert. cooperative,  in no acute distress.   Skin: Intact,  dry skin, no lesions, rash, petechiae or purpura.   Eyes: PERRLA, EOMs intact,  Conjunctiva pink with no redness or exudates.   HENT: Normocephalic, atraumatic. Nares patent. No intraoral lesions.  Dry mucous membranes.  Neck: Supple, without meningismus. Trachea at midline. No lymphadenopathy.  C-collar in place.  Pulmonary: Clear bilaterally with good chest wall excursion. No rales, rhonchi or wheezing. No accessory muscle use or stridor.  Cardiac: Regular rate and rhythm, no rubs, murmurs, or gallops.   Abdomen: Abdomen is soft, nontender, and nondistended.  No palpable organomegaly.  No rebound or guarding.  No CVA tenderness. Nonsurgical abdomen.  Genitourinary: Exam deferred.  Musculoskeletal: Full range of motion.  Pulses full and equal. No cyanosis, clubbing, or edema.  Neurological:  Cranial nerves are grossly intact, grossly normal sensation, no weakness, no focal findings identified.  Psychiatric: Appropriate mood and affect.                Patient History   Past Medical History:   Diagnosis Date    HTN (hypertension)     Pancreatic cancer (Multi)      Past Surgical History:   Procedure Laterality Date    OTHER SURGICAL HISTORY  09/26/2019    Shoulder surgery    OTHER SURGICAL HISTORY  09/26/2019     Open reduction-internal fixation    OTHER SURGICAL HISTORY  09/26/2019    Hip surgery    OTHER SURGICAL HISTORY  09/26/2019    Tubal ligation    OTHER SURGICAL HISTORY  10/24/2022    Epidural steroid injection    OTHER SURGICAL HISTORY  03/02/2022    Arterial stent placement    OTHER SURGICAL HISTORY  03/21/2022    Intra-articular corticosteroid injection    OTHER SURGICAL HISTORY  03/02/2022    Foot surgery     Family History   Problem Relation Name Age of Onset    Hypertension Mother      Cancer Father      Cancer Brother       Social History     Tobacco Use    Smoking status: Every Day     Current packs/day: 0.50     Types: Cigarettes    Smokeless tobacco: Not on file   Substance Use Topics    Alcohol use: Never    Drug use: Not on file       Physical Exam   ED Triage Vitals [07/24/24 0810]   Temperature Heart Rate Respirations BP   36.9 °C (98.5 °F) 84 16 113/56      Pulse Ox Temp Source Heart Rate Source Patient Position   97 % Temporal Monitor Lying      BP Location FiO2 (%)     Right arm --       Physical Exam      ED Course & MDM   Diagnoses as of 07/24/24 1233   Weakness   Hypokalemia   Frequent falls   Closed head injury, initial encounter                       [place] Coma Scale Score: 13                        Medical Decision Making  Labs Reviewed  CBC WITH AUTO DIFFERENTIAL - Abnormal     WBC                           12.5 (*)               nRBC                          0.0                    RBC                           3.57 (*)               Hemoglobin                    10.7 (*)               Hematocrit                    32.5 (*)               MCV                           91                     MCH                           30.0                   MCHC                          32.9                   RDW                           14.0                   Platelets                     269                    Neutrophils %                 98.1                   Immature Granulocytes %, Automated   0.3                     Lymphocytes %                 1.0                    Monocytes %                   0.4                    Eosinophils %                 0.1                    Basophils %                   0.1                    Neutrophils Absolute          12.23 (*)               Immature Granulocytes Absolute, Au*   0.04                   Lymphocytes Absolute          0.13 (*)               Monocytes Absolute            0.05                   Eosinophils Absolute          0.01                   Basophils Absolute            0.01                COMPREHENSIVE METABOLIC PANEL - Abnormal     Glucose                       97                     Sodium                        133 (*)                Potassium                     3.2 (*)                Chloride                      97 (*)                 Bicarbonate                   28                     Anion Gap                     11                     Urea Nitrogen                 13                     Creatinine                    0.46 (*)               eGFR                          >90                    Calcium                       8.1 (*)                Albumin                       2.7 (*)                Alkaline Phosphatase          673 (*)                Total Protein                 5.5 (*)                AST                           487 (*)                Bilirubin, Total              0.9                    ALT                           210 (*)             TROPONIN I, HIGH SENSITIVITY - Abnormal     Troponin I, High Sensitivity   37 (*)                     Narrative: Less than 99th percentile of normal range cutoff-                  Female and children under 18 years old <14 ng/L; Male <21 ng/L: Negative                  Repeat testing should be performed if clinically indicated.                                     Female and children under 18 years old 14-50 ng/L; Male 21-50 ng/L:                  Consistent with possible cardiac damage and possible  increased clinical                   risk. Serial measurements may help to assess extent of myocardial damage.                                     >50 ng/L: Consistent with cardiac damage, increased clinical risk and                  myocardial infarction. Serial measurements may help assess extent of                   myocardial damage.                                      NOTE: Children less than 1 year old may have higher baseline troponin                   levels and results should be interpreted in conjunction with the overall                   clinical context.                                     NOTE: Troponin I testing is performed using a different                   testing methodology at [facility] than at other                   [facility]. Direct result comparisons should only                   be made within the same method.  LIPASE - Abnormal     Lipase                        154 (*)                    Narrative: Venipuncture immediately after or during the administration of Metamizole may lead to falsely low results. Testing should be performed immediately prior to Metamizole dosing.  URINALYSIS WITH REFLEX CULTURE AND MICROSCOPIC - Abnormal     Color, Urine                  Yellow                 Appearance, Urine             Clear                  Specific Gravity, Urine       1.027                  pH, Urine                     7.5                    Protein, Urine                NEGATIVE                Glucose, Urine                Normal                 Blood, Urine                  NEGATIVE                Ketones, Urine                NEGATIVE                Bilirubin, Urine              NEGATIVE                Urobilinogen, Urine           2 (1+) (*)               Nitrite, Urine                NEGATIVE                Leukocyte Esterase, Urine     NEGATIVE             URINALYSIS WITH REFLEX CULTURE AND MICROSCOPIC         Narrative: The following orders were created for panel  order Urinalysis with Reflex Culture and Microscopic.                  Procedure                               Abnormality         Status                                     ---------                               -----------         ------                                     Urinalysis with Reflex C...[[id number]]  Abnormal            Final result                               Extra Urine Gray Tube[[id number]]                            In process                                                   Please view results for these tests on the individual orders.  EXTRA URINE GRAY TUBE  CT chest abdomen pelvis w IV contrast   Final Result    CHEST:    1.  Stable left perihilar confluent soft tissue and bronchial wall    thickening, likely related to prior treatment changes.    2. New 8 mm right upper lobe subpleural pulmonary nodule.    3. No thoracic adenopathy.          ABDOMEN-PELVIS:    1.  No acute findings in the abdomen and pelvis.    2. 24 x 23 mm pancreatic head/uncinate process hypoenhancing mass    with pneumobilia and biliary stent as described above. Findings    likely related to known pancreatic malignancy.    3. Extensive colonic stool with fecal impaction.                Signed by: Home Garza 2024 10:46 AM    Dictation workstation:   [id number]     CT head wo IV contrast   Final Result    No evidence of an acute intracranial process. Age related findings.    Bilateral otomastoiditis. Stable ventriculomegaly.          MACRO:    None.          Signed by: Jaspal Gill 2024 8:46 AM    Dictation workstation:   [id number]     CT cervical spine wo IV contrast   Final Result    No evidence of an acute fracture. Degenerative changes.          MACRO:    None.          Signed by: Jaspal Gill 2024 8:49 AM    Dictation workstation:   [id number]     Medical Decision Makin-year-old female with past medical history of metastatic pancreatic cancer requiring stenting presents with concern for  weakness and fall. Patient was on outpatient hospice. Case is discussed with power of , daughter, who advised that she did want a workup. Case was discussed with hospice who states they will revoke hospice for workup. Daughter states that she has been having frequent falls and inability to help care for herself at the group home. Lab work shows a hypokalemia. Malnutrition. Treated with 1 L normal saline and IV potassium. CT brain, cervical spine, chest abdomen pelvis shows no traumatic injury. Urine without infection. Discussed again with daughter who agrees patient will likely need placement in a nursing home can resume hospice.  Case discussed with hospitalist who is agreeable with admission.  Admitted in stable condition.    Differential Diagnoses Considered: Volume depletion, electrolyte abnormality, intracranial hemorrhage versus contusion, UTI    Independent Interpretation of Studies:  I independently interpreted: CT brain shows no intracranial hemorrhage.  CT cervical spine without acute fracture or dislocation.    Escalation of Care:  Appropriate for admission for further treatment and evaluation.    Discussion of Management with Other Providers:   I discussed the patient/results with: Admitting hospitalist.            Procedure  ECG 12 lead    Performed by: Savage Chu DO  Authorized by: Savage Chu DO    ECG interpreted by ED Physician in the absence of a cardiologist: yes    Comments:      EKG interpreted by Dr. Savage Chu: Normal sinus rhythm at 98 bpm.  TX interval 140 ms.  QTc of 520 ms.  Intraventricular conduction delay.       Savage Chu DO  07/24/24 [id number]

## 2024-07-24 NOTE — H&P
History Of Present Illness  Bel Cruz is a 83 y.o. female with a past medical history of COPD, CHF chronic hypoxia lung cancer, MI, CVA, and pancreatic cancer presented to the emergency room from nursing home for a fall.  Last known well prior to arrival was 2 hours.  The patient is a hospice patient.  Her daughter who is POA advised she did not want a workup.CT brain, cervical spine, chest abdomen pelvis shows no traumatic injury. Urine without infection. Lab work shows a hypokalemia. Malnutrition. Treated with 1 L normal saline and IV potassium Discussed again with daughter who agrees patient will likely need placement in a nursing home can resume hospice.      Past Medical History  Past Medical History:   Diagnosis Date    HTN (hypertension)     Pancreatic cancer (Multi)        Surgical History  Past Surgical History:   Procedure Laterality Date    OTHER SURGICAL HISTORY  09/26/2019    Shoulder surgery    OTHER SURGICAL HISTORY  09/26/2019    Open reduction-internal fixation    OTHER SURGICAL HISTORY  09/26/2019    Hip surgery    OTHER SURGICAL HISTORY  09/26/2019    Tubal ligation    OTHER SURGICAL HISTORY  10/24/2022    Epidural steroid injection    OTHER SURGICAL HISTORY  03/02/2022    Arterial stent placement    OTHER SURGICAL HISTORY  03/21/2022    Intra-articular corticosteroid injection    OTHER SURGICAL HISTORY  03/02/2022    Foot surgery        Social History  She reports that she has been smoking cigarettes. She does not have any smokeless tobacco history on file. She reports that she does not drink alcohol. No history on file for drug use.    Family History  Family History   Problem Relation Name Age of Onset    Hypertension Mother      Cancer Father      Cancer Brother          Allergies  Hydrocodone, Levaquin [levofloxacin], and Lisinopril    Review of Systems  Patient very hard of hearing and confused unable to contribute to review of systems other than that she has to have a bowel movement.  " Most history obtained through chart review.  Physical Exam   Appearance: Alert. cooperative, frail cachectic  Skin: Intact,  dry skin, no lesions, rash, petechiae or purpura.   Eyes: PERRLA, EOMs intact,  Conjunctiva pink with no redness or exudates.   HENT: Normocephalic, atraumatic. Nares patent. No intraoral lesions.  Dry mucous membranes.  Neck: Supple, without meningismus. Trachea at midline. No lymphadenopathy.  C-collar in place.  Pulmonary: Coarse breath sounds bilateral excursion.   Cardiac: Regular rate and rhythm, no rubs, murmurs, or gallops.   Abdomen: Abdomen is soft, nontender, and nondistended.  No palpable organomegaly.  No rebound or guarding.    Musculoskeletal: Full range of motion.  Pulses full and equal. No cyanosis, clubbing, or edema.  Neurological:  Cranial nerves are grossly intact, grossly normal sensation, no weakness, no focal findings identified.  Psychiatric: Appropriate mood and affect.  Last Recorded Vitals  Blood pressure 128/66, pulse 74, temperature 36.9 °C (98.4 °F), temperature source Temporal, resp. rate 20, height 1.575 m (5' 2\"), weight 45.4 kg (100 lb), SpO2 97%.    Relevant Results    Results for orders placed or performed during the hospital encounter of 07/24/24 (from the past 24 hour(s))   CBC and Auto Differential   Result Value Ref Range    WBC 12.5 (H) 4.4 - 11.3 x10*3/uL    nRBC 0.0 0.0 - 0.0 /100 WBCs    RBC 3.57 (L) 4.00 - 5.20 x10*6/uL    Hemoglobin 10.7 (L) 12.0 - 16.0 g/dL    Hematocrit 32.5 (L) 36.0 - 46.0 %    MCV 91 80 - 100 fL    MCH 30.0 26.0 - 34.0 pg    MCHC 32.9 32.0 - 36.0 g/dL    RDW 14.0 11.5 - 14.5 %    Platelets 269 150 - 450 x10*3/uL    Neutrophils % 98.1 40.0 - 80.0 %    Immature Granulocytes %, Automated 0.3 0.0 - 0.9 %    Lymphocytes % 1.0 13.0 - 44.0 %    Monocytes % 0.4 2.0 - 10.0 %    Eosinophils % 0.1 0.0 - 6.0 %    Basophils % 0.1 0.0 - 2.0 %    Neutrophils Absolute 12.23 (H) 1.60 - 5.50 x10*3/uL    Immature Granulocytes Absolute, " Automated 0.04 0.00 - 0.50 x10*3/uL    Lymphocytes Absolute 0.13 (L) 0.80 - 3.00 x10*3/uL    Monocytes Absolute 0.05 0.05 - 0.80 x10*3/uL    Eosinophils Absolute 0.01 0.00 - 0.40 x10*3/uL    Basophils Absolute 0.01 0.00 - 0.10 x10*3/uL   Comprehensive metabolic panel   Result Value Ref Range    Glucose 97 74 - 99 mg/dL    Sodium 133 (L) 136 - 145 mmol/L    Potassium 3.2 (L) 3.5 - 5.3 mmol/L    Chloride 97 (L) 98 - 107 mmol/L    Bicarbonate 28 21 - 32 mmol/L    Anion Gap 11 10 - 20 mmol/L    Urea Nitrogen 13 6 - 23 mg/dL    Creatinine 0.46 (L) 0.50 - 1.05 mg/dL    eGFR >90 >60 mL/min/1.73m*2    Calcium 8.1 (L) 8.6 - 10.3 mg/dL    Albumin 2.7 (L) 3.4 - 5.0 g/dL    Alkaline Phosphatase 673 (H) 33 - 136 U/L    Total Protein 5.5 (L) 6.4 - 8.2 g/dL     (H) 9 - 39 U/L    Bilirubin, Total 0.9 0.0 - 1.2 mg/dL     (H) 7 - 45 U/L   Troponin I, High Sensitivity   Result Value Ref Range    Troponin I, High Sensitivity 37 (H) 0 - 13 ng/L   Lipase   Result Value Ref Range    Lipase 154 (H) 9 - 82 U/L   Urinalysis with Reflex Culture and Microscopic   Result Value Ref Range    Color, Urine Yellow Light-Yellow, Yellow, Dark-Yellow    Appearance, Urine Clear Clear    Specific Gravity, Urine 1.027 1.005 - 1.035    pH, Urine 7.5 5.0, 5.5, 6.0, 6.5, 7.0, 7.5, 8.0    Protein, Urine NEGATIVE NEGATIVE, 10 (TRACE), 20 (TRACE) mg/dL    Glucose, Urine Normal Normal mg/dL    Blood, Urine NEGATIVE NEGATIVE    Ketones, Urine NEGATIVE NEGATIVE mg/dL    Bilirubin, Urine NEGATIVE NEGATIVE    Urobilinogen, Urine 2 (1+) (A) Normal mg/dL    Nitrite, Urine NEGATIVE NEGATIVE    Leukocyte Esterase, Urine NEGATIVE NEGATIVE     CT chest abdomen pelvis w IV contrast    Result Date: 7/24/2024  Interpreted By:  Home Garza, STUDY: CT CHEST ABDOMEN PELVIS W IV CONTRAST;  7/24/2024 10:20 am   INDICATION: Signs/Symptoms:fall. abdominal pain. weakness.   COMPARISON: CT chest 06/29/2022   ACCESSION NUMBER(S): [id number]   ORDERING CLINICIAN:  DORIS CH   TECHNIQUE: CT of the chest, abdomen, and pelvis was performed.  Contiguous axial images were obtained at 3 mm slice thickness through the chest, abdomen and pelvis. Coronal and sagittal reconstructions at 3 mm slice thickness were performed. 70 ml of contrast Omnipaque 350 were administered intravenously without immediate complication.   FINDINGS: CHEST:   LUNG/PLEURA/LARGE AIRWAYS: Central airways are patent without endobronchial lesions. Diffuse bilateral bronchial wall thickening. Stable confluent airspace opacity with air bronchograms, left perihilar region, extending laterally to the subpleural region, stable since 2022 likely sequela of treatment changes. New 8 mm right apical subpleural nodule (series 3, image 29). Otherwise, no new suspicious pulmonary nodule or mass. No focal consolidation. No pneumothorax. No pleural effusion.   VESSELS: Aorta and pulmonary arteries are normal caliber.  Moderate atherosclerotic changes are noted of the aorta and branching vessels. Severe coronary artery calcifications are present.   HEART: The heart is slightly enlarged.  No pericardial effusion   MEDIASTINUM AND GREYSON: No mediastinal, hilar or axillary lymphadenopathy is present.  The esophagus is nondilated and appears normal in entire length.   CHEST WALL AND LOWER NECK: No acute osseous abnormality. Multilevel degenerative changes of the thoracic spine. Right shoulder arthroplasty. Severe left shoulder osteoarthritis. The visualized thyroid gland appears within normal limits.   ABDOMEN:   LIVER: The liver is enlarged and measures 17.5 cm in craniocaudal dimension. No focal lesion.   BILE DUCTS: Moderate diffuse pneumobilia. Biliary stent is noted within the common bile duct and terminating at the ampulla.   GALLBLADDER: Moderately distended gallbladder with pericholecystic fluid.   PANCREAS: 24 x 23 mm pancreatic head/uncinate process hypoenhancing mass (series 2, image 101). Diffuse pancreatic  atrophy with diffuse ductal dilation measuring up to 6 mm. Findings compatible with known pancreatic malignancy.   SPLEEN: The spleen is normal in size without focal lesions.   ADRENAL GLANDS: Bilateral adrenal glands appear normal.   KIDNEYS AND URETERS: The kidneys are normal in size and enhance symmetrically. 13 mm medial left interpolar region cyst. No hydroureteronephrosis or nephroureterolithiasis is identified.   PELVIS:   BLADDER: Urinary bladder is unremarkable.   REPRODUCTIVE ORGANS: No pelvic masses.   BOWEL: Stomach is unremarkable. No bowel dilation or wall thickening. Extensive colonic stool with fecal impaction. The appendix is not definitely visualized. There is however no pericecal stranding or fluid.     VESSELS: There is no aneurysmal dilatation of the abdominal aorta. The IVC appears normal. Severe diffuse atherosclerotic calcification of the abdominal aorta and iliac arteries.   PERITONEUM/RETROPERITONEUM/LYMPH NODES: No ascites or free air, no fluid collection.  No abdominopelvic lymphadenopathy is present.   BONE AND SOFT TISSUE: No suspicious osseous lesions are identified. Degenerative discogenic disease is noted in the lower thoracic and lumbar spine. Left hip arthroplasty. Diffuse subcutaneous edema.       CHEST: 1.  Stable left perihilar confluent soft tissue and bronchial wall thickening, likely related to prior treatment changes. 2. New 8 mm right upper lobe subpleural pulmonary nodule. 3. No thoracic adenopathy.   ABDOMEN-PELVIS: 1.  No acute findings in the abdomen and pelvis. 2. 24 x 23 mm pancreatic head/uncinate process hypoenhancing mass with pneumobilia and biliary stent as described above. Findings likely related to known pancreatic malignancy. 3. Extensive colonic stool with fecal impaction.     Signed by: Home Garza 7/24/2024 10:46 AM Dictation workstation:   [id number]    CT cervical spine wo IV contrast    Result Date: 7/24/2024  Interpreted By:  Jaspal Gill, STUDY:  CT CERVICAL SPINE WO IV CONTRAST;  7/24/2024 8:40 am   INDICATION: Signs/Symptoms:fall with head injury.   COMPARISON: None.   ACCESSION NUMBER(S): [id number]   ORDERING CLINICIAN: DORIS CH   TECHNIQUE: Unenhanced axial images were obtained through the cervical spine. The axial data was utilized to reconstruct images in sagittal and coronal planes.   FINDINGS: No acute fracture is identified. Degenerative changes include disc space narrowing, endplate spurring, endplate sclerosis, uncovertebral spurring, posterior disc osteophyte complexes, and facet hypertrophy. There is mild subluxation at several levels, most likely related to facet degenerative disease. The alignment is unchanged when compared to the previous study. Facet joints demonstrate a normal alignment. Prevertebral soft tissues are within normal limits. No lytic or blastic lesion is noted.       No evidence of an acute fracture. Degenerative changes.   MACRO: None.   Signed by: Jaspal Gill 7/24/2024 8:49 AM Dictation workstation:   [id number]    CT head wo IV contrast    Result Date: 7/24/2024  Interpreted By:  Jaspal Gill, STUDY: CT HEAD WO IV CONTRAST;  7/24/2024 8:40 am   INDICATION: Signs/Symptoms:fall with head injury.   COMPARISON: 08/24/2022   ACCESSION NUMBER(S): [id number]   ORDERING CLINICIAN: DORIS CH   TECHNIQUE: Unenhanced images were obtained through the brain.   FINDINGS: There is atrophy resulting in prominence of the ventricles and sulci. Degree of ventricular dilatation appears disproportionate when compared to the sulcal prominence which is nonspecific but can be associated with normal pressure hydrocephalus. The size of the ventricles is unchanged when compared to the previous study. There are areas of decreased attenuation within the white matter which are nonspecific but are commonly associated with small vessel ischemic disease. Area of encephalomalacia in the left cerebellum, compatible with a remote  infarction. There is no mass effect or midline shift. No acute intracranial hemorrhage is identified. No extra-axial fluid collections are seen. No intraparenchymal mass lesions are identified.  Bone windows demonstrate no evidence of an acute calvarial fracture. There is opacification of the mastoid air cells and middle ear cavities bilaterally, compatible with otomastoiditis.       No evidence of an acute intracranial process. Age related findings. Bilateral otomastoiditis. Stable ventriculomegaly.   MACRO: None.   Signed by: Jaspal Gill 7/24/2024 8:46 AM Dictation workstation:   [id number]      Bel Cruz is a 83 y.o. female with a past medical history of COPD, CHF chronic hypoxia lung cancer, MI, CVA, and pancreatic cancer presented to the emergency room from nursing home for a fall.  Last known well prior to arrival was 2 hours.  The patient is a hospice patient.  Her daughter who is POA advised she did not want a workup.CT brain, cervical spine, chest abdomen pelvis shows no traumatic injury. Urine without infection. Lab work shows a hypokalemia. Malnutrition. Treated with 1 L normal saline and IV potassium Discussed again with daughter who agrees patient will likely need placement in a nursing home can resume hospice.         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  Repeat CBC in the morning  -CT shows bilateral bronchial wall thickening  -New 8 mm right apical subpleural nodule  -Negative for consolidation, pneumothorax or pleural effusion    Pancreatic malignancy  -Plan at this time is to transition back to hospice at SNF  -Pain and anxiety medications available    Hypokalemia  -Replace and repeat BMP in the morning    Protein  malnutrition  -Dietitian consult    Extensive colonic stool with fecal impaction  -Patient had a moderate bowel movement upon arrival to room  -Continue aggressive bowel regimen    DVT prophylaxis SCDs and Lovenox    Disposition return to SNF on hospice care when medically stable 24 to 48 hours          Елена Lorenzo, APRN-CNP

## 2024-07-24 NOTE — CARE PLAN
The patient's goals for the shift include  use call light for any needs    The clinical goals for the shift include  assess neuro sx and pt for any deficits

## 2024-07-25 ENCOUNTER — APPOINTMENT (OUTPATIENT)
Dept: RADIOLOGY | Facility: HOSPITAL | Age: 84
DRG: 193 | End: 2024-07-25
Payer: MEDICARE

## 2024-07-25 PROBLEM — R53.1 WEAKNESS: Status: ACTIVE | Noted: 2024-07-25

## 2024-07-25 LAB
ANION GAP SERPL CALC-SCNC: 10 MMOL/L (ref 10–20)
ATRIAL RATE: 98 BPM
BUN SERPL-MCNC: 15 MG/DL (ref 6–23)
CALCIUM SERPL-MCNC: 7.9 MG/DL (ref 8.6–10.3)
CHLORIDE SERPL-SCNC: 98 MMOL/L (ref 98–107)
CO2 SERPL-SCNC: 28 MMOL/L (ref 21–32)
CREAT SERPL-MCNC: 0.42 MG/DL (ref 0.5–1.05)
EGFRCR SERPLBLD CKD-EPI 2021: >90 ML/MIN/1.73M*2
ERYTHROCYTE [DISTWIDTH] IN BLOOD BY AUTOMATED COUNT: 14.5 % (ref 11.5–14.5)
GLUCOSE SERPL-MCNC: 79 MG/DL (ref 74–99)
HCT VFR BLD AUTO: 32.1 % (ref 36–46)
HGB BLD-MCNC: 10.6 G/DL (ref 12–16)
MCH RBC QN AUTO: 29.8 PG (ref 26–34)
MCHC RBC AUTO-ENTMCNC: 33 G/DL (ref 32–36)
MCV RBC AUTO: 90 FL (ref 80–100)
NRBC BLD-RTO: 0 /100 WBCS (ref 0–0)
P OFFSET: 194 MS
P ONSET: 140 MS
PLATELET # BLD AUTO: 270 X10*3/UL (ref 150–450)
POTASSIUM SERPL-SCNC: 3.7 MMOL/L (ref 3.5–5.3)
PR INTERVAL: 140 MS
PROCALCITONIN SERPL-MCNC: 42.46 NG/ML
Q ONSET: 210 MS
QRS COUNT: 15 BEATS
QRS DURATION: 138 MS
QT INTERVAL: 408 MS
QTC CALCULATION(BAZETT): 520 MS
QTC FREDERICIA: 480 MS
R AXIS: -13 DEGREES
RBC # BLD AUTO: 3.56 X10*6/UL (ref 4–5.2)
SODIUM SERPL-SCNC: 132 MMOL/L (ref 136–145)
T AXIS: 105 DEGREES
T OFFSET: 414 MS
VENTRICULAR RATE: 98 BPM
WBC # BLD AUTO: 26.4 X10*3/UL (ref 4.4–11.3)

## 2024-07-25 PROCEDURE — 36415 COLL VENOUS BLD VENIPUNCTURE: CPT | Performed by: NURSE PRACTITIONER

## 2024-07-25 PROCEDURE — 71045 X-RAY EXAM CHEST 1 VIEW: CPT | Performed by: RADIOLOGY

## 2024-07-25 PROCEDURE — 87899 AGENT NOS ASSAY W/OPTIC: CPT | Mod: SAMLAB | Performed by: NURSE PRACTITIONER

## 2024-07-25 PROCEDURE — 84145 PROCALCITONIN (PCT): CPT | Mod: SAMLAB | Performed by: NURSE PRACTITIONER

## 2024-07-25 PROCEDURE — 97161 PT EVAL LOW COMPLEX 20 MIN: CPT | Mod: GP

## 2024-07-25 PROCEDURE — 94640 AIRWAY INHALATION TREATMENT: CPT

## 2024-07-25 PROCEDURE — 80048 BASIC METABOLIC PNL TOTAL CA: CPT | Performed by: NURSE PRACTITIONER

## 2024-07-25 PROCEDURE — 2500000004 HC RX 250 GENERAL PHARMACY W/ HCPCS (ALT 636 FOR OP/ED): Performed by: NURSE PRACTITIONER

## 2024-07-25 PROCEDURE — 71045 X-RAY EXAM CHEST 1 VIEW: CPT

## 2024-07-25 PROCEDURE — 94761 N-INVAS EAR/PLS OXIMETRY MLT: CPT

## 2024-07-25 PROCEDURE — 1100000001 HC PRIVATE ROOM DAILY

## 2024-07-25 PROCEDURE — 2500000002 HC RX 250 W HCPCS SELF ADMINISTERED DRUGS (ALT 637 FOR MEDICARE OP, ALT 636 FOR OP/ED): Performed by: NURSE PRACTITIONER

## 2024-07-25 PROCEDURE — 97165 OT EVAL LOW COMPLEX 30 MIN: CPT | Mod: GO

## 2024-07-25 PROCEDURE — 2500000001 HC RX 250 WO HCPCS SELF ADMINISTERED DRUGS (ALT 637 FOR MEDICARE OP): Performed by: NURSE PRACTITIONER

## 2024-07-25 PROCEDURE — 87449 NOS EACH ORGANISM AG IA: CPT | Mod: SAMLAB | Performed by: NURSE PRACTITIONER

## 2024-07-25 PROCEDURE — 2500000005 HC RX 250 GENERAL PHARMACY W/O HCPCS: Performed by: EMERGENCY MEDICINE

## 2024-07-25 PROCEDURE — 2500000005 HC RX 250 GENERAL PHARMACY W/O HCPCS: Performed by: NURSE PRACTITIONER

## 2024-07-25 PROCEDURE — 85027 COMPLETE CBC AUTOMATED: CPT | Performed by: NURSE PRACTITIONER

## 2024-07-25 RX ORDER — CEFTRIAXONE 2 G/50ML
2 INJECTION, SOLUTION INTRAVENOUS EVERY 24 HOURS
Status: DISCONTINUED | OUTPATIENT
Start: 2024-07-25 | End: 2024-07-28 | Stop reason: HOSPADM

## 2024-07-25 ASSESSMENT — PAIN SCALES - GENERAL
PAINLEVEL_OUTOF10: 6
PAINLEVEL_OUTOF10: 3
PAINLEVEL_OUTOF10: 0 - NO PAIN
PAINLEVEL_OUTOF10: 5 - MODERATE PAIN
PAINLEVEL_OUTOF10: 8
PAINLEVEL_OUTOF10: 5 - MODERATE PAIN

## 2024-07-25 ASSESSMENT — PAIN - FUNCTIONAL ASSESSMENT
PAIN_FUNCTIONAL_ASSESSMENT: 0-10

## 2024-07-25 ASSESSMENT — COGNITIVE AND FUNCTIONAL STATUS - GENERAL
MOBILITY SCORE: 22
DRESSING REGULAR UPPER BODY CLOTHING: A LITTLE
HELP NEEDED FOR BATHING: A LITTLE
DAILY ACTIVITIY SCORE: 14
DRESSING REGULAR LOWER BODY CLOTHING: A LOT
DAILY ACTIVITIY SCORE: 19
DRESSING REGULAR UPPER BODY CLOTHING: A LITTLE
PERSONAL GROOMING: A LOT
DRESSING REGULAR LOWER BODY CLOTHING: A LOT
TURNING FROM BACK TO SIDE WHILE IN FLAT BAD: A LITTLE
MOBILITY SCORE: 16
TOILETING: A LITTLE
WALKING IN HOSPITAL ROOM: A LITTLE
EATING MEALS: A LITTLE
CLIMB 3 TO 5 STEPS WITH RAILING: TOTAL
TOILETING: A LOT
HELP NEEDED FOR BATHING: A LOT
MOVING FROM LYING ON BACK TO SITTING ON SIDE OF FLAT BED WITH BEDRAILS: A LITTLE
MOVING TO AND FROM BED TO CHAIR: A LITTLE
STANDING UP FROM CHAIR USING ARMS: A LITTLE
CLIMB 3 TO 5 STEPS WITH RAILING: A LOT

## 2024-07-25 ASSESSMENT — PAIN DESCRIPTION - ORIENTATION: ORIENTATION: LOWER

## 2024-07-25 ASSESSMENT — PAIN DESCRIPTION - LOCATION
LOCATION: ABDOMEN
LOCATION: BACK
LOCATION: ABDOMEN

## 2024-07-25 ASSESSMENT — ACTIVITIES OF DAILY LIVING (ADL): BATHING_ASSISTANCE: MODERATE

## 2024-07-25 NOTE — CONSULTS
"Reason For ConsultNutrition Initial Assessment:   Nutrition Assessment    Reason for Assessment: Admission nursing screening    Patient is a 83 y.o. female presenting with Pneumonia, fall    7/25/24 MST consult.    history of COPD, CHF chronic hypoxia lung cancer, MI, CVA, and pancreatic cancer presented to the emergency room from nursing home for a fall.  Was on hospice services.    Patient seen - stated eating small amounts and stated weight loss.  Agreed to Ensure plus HP and Magic cup.  Denied difficulty chewing/swallowing.      Nutrition History:  Energy Intake: Poor < 50 %  Food and Nutrient History: poor intake i taking Remeron for appetite.  Resides at [facility]  Food Allergies/Intolerances:  None  GI Symptoms: None and moiitor for constipation as on pain medications  Oral Problems: None       Anthropometrics:  Height: 157.5 cm (5' 2.01\")   Weight: 45.4 kg (100 lb)   BMI (Calculated): 18.29  IBW/kg (Dietitian Calculated): 50 kg  Percent of IBW: 91 %       Weight History:   Daily Weight  07/24/24 : 45.4 kg (100 lb)  02/01/24 : 59 kg (130 lb)  01/06/24 : 54.4 kg (120 lb)  10/21/22 : 81.1 kg (178 lb 12.7 oz)  06/23/22 : 81.6 kg (180 lb)  03/18/22 : 83 kg (182 lb 15.7 oz)  03/02/22 : 81.6 kg (180 lb)     Weight Change %:  Weight History / % Weight Change: Loss 48# (32%) in 8 mo  Significant Weight Loss: Yes  Interpretation of Weight Loss: >20% in 1 year    Nutrition Focused Physical Exam Findings:    Subcutaneous Fat Loss:   Orbital Fat Pads: Severe (dark circles, hollowing and loose skin)  Buccal Fat Pads: Mild-Moderate (flat cheeks, minimal bounce)  Triceps: Mild-Moderate (less than ample fat tissue)  Ribs: Defer  Muscle Wasting:  Temporalis: Severe (hollowed scooping depression)  Pectoralis (Clavicular Region): Severe (protruding prominent clavicle)  Deltoid/Trapezius: Severe (squared shoulders, acromion process prominent)  Interosseous: Severe (depressed area between thumb and " forefinger)  Trapezius/Infraspinatus/Supraspinatus (Scapular Region): Severe (prominent visual scapula, depression between ribs, scapula or shoulder)  Quadriceps: Severe (depressions on inner and outer thigh)  Gastrocnemius: Severe (minimal muscle definition)  Edema:  Edema: none  Physical Findings:  Hair: Negative  Eyes: Negative  Mouth: Positive (tongue red and thick/swollen)  Nails: Negative  Skin: Negative    Nutrition Significant Labs:  Liver Function Trend:   Results from last 7 days   Lab Units 07/24/24  0837   ALK PHOS U/L 673*   AST U/L 487*   ALT U/L 210*   BILIRUBIN TOTAL mg/dL 0.9    , Renal Lab Trend:   Results from last 7 days   Lab Units 07/25/24  0452 07/24/24  0837   POTASSIUM mmol/L 3.7 3.2*   SODIUM mmol/L 132* 133*   EGFR mL/min/1.73m*2 >90 >90   BUN mg/dL 15 13   CREATININE mg/dL 0.42* 0.46*    , Vit D:   Lab Results   Component Value Date    VITD25 20 (A) 02/15/2022        Nutrition Specific Medications:  Scheduled medications    mirtazapine, 15 mg, oral, Nightly  morphine CR, 15 mg, oral, q12h CARRIE  oxygen, , inhalation, Continuous - Inhalation  pantoprazole, 40 mg, oral, Daily before breakfast  polyethylene glycol, 17 g, oral, Daily  predniSONE, 5 mg, oral, Daily  sennosides-docusate sodium, 1 tablet, oral, Daily    I/O:   Last BM Date:  (unable to assess); Stool Appearance: Hard (07/24/24 1719)    Dietary Orders (From admission, onward)       Start     Ordered    07/25/24 1110  Oral nutritional supplements  Until discontinued        Comments: vanilla   Question Answer Comment   Deliver with Lunch    Select supplement: Ensure Plus High Protein        07/25/24 1111    07/25/24 1110  Oral nutritional supplements  Until discontinued        Comments: Butter pecan   Question Answer Comment   Deliver with Dinner    Select supplement: Magic Cup        07/25/24 1111    07/24/24 1556  Adult diet Regular  Diet effective now        Question:  Diet type  Answer:  Regular    07/24/24 9957                      Estimated Needs:   Total Energy Estimated Needs (kCal): 1600 kCal  Method for Estimating Needs: 30-35 kcal/kg = 6879-2012 kcal  Total Protein Estimated Needs (g): 68 g  Method for Estimating Needs: 1.2-1.5 gm/kg = 54-68 gm  Total Fluid Estimated Needs (mL): 1600 mL  Method for Estimating Needs: 1 ml/kcal        Nutrition Diagnosis   Malnutrition Diagnosis  Patient has Malnutrition Diagnosis: Yes  Diagnosis Status: New  Malnutrition Diagnosis: Severe malnutrition related to chronic disease or condition  As Evidenced by: significant weight loss >20% in 8 mo, severe muscle atrophy and fat store loss (see NFPE), intake predicted <50% estimated needs x >7 days    Nutrition Diagnosis  Patient has Nutrition Diagnosis: Yes  Diagnosis Status (1): New  Nutrition Diagnosis 1: Altered GI function  Related to (1): pancreatic cancer  As Evidenced by (1): elevated liver enzymes       Nutrition Interventions/Recommendations         Nutrition Prescription:  Individualized Nutrition Prescription Provided for : Oral nutrition        Nutrition Interventions:   Interventions: Meals and snacks, Medical food supplement  Meals and Snacks: General healthful diet  Goal: Regular diet  Medical Food Supplement: Commercial beverage, Commercial food  Goal: Ensure plus HP x 1 = 350 kcal, 20 gm protein,  Magic cup x1 = 290 kcal, 9 gm protein    Collaboration and Referral of Nutrition Care: Team meeting involving nutrition professional  Goal: IDT meeting    Nutrition Education:  Not indicated         Nutrition Monitoring and Evaluation   Food/Nutrient Related History Monitoring  Monitoring and Evaluation Plan: Amount of food  Amount of Food: Estimated amout of food, Medical food intake  Criteria: >50% meals, >75% nutritional supplements    Body Composition/Growth/Weight History  Monitoring and Evaluation Plan: Weight  Weight: Measured weight  Criteria: Maintain/gain    Biochemical Data, Medical Tests and Procedures  Monitoring and Evaluation Plan:  GI profile  Gastrointestinal Profile: Alkaline phosphatase, Alanine aminotransferase (ALT), Aspartate aminotransferase (AST), Bilirubin, total  Criteria: Monitor with history of pancreatic cancer    Nutrition Focused Physical Findings  Monitoring and Evaluation Plan: Adipose, Muscles  Adipose: Loss of subcutaneous fat  Criteria: Prevent further fat store loss  Muscles: Muscle atrophy  Criteria: Prevent further muscle atrophy      Time Spent (min): 45 minutes        Elina Ramirez RDN, LD

## 2024-07-25 NOTE — PROGRESS NOTES
"Physical Therapy    Physical Therapy Evaluation    Patient Name: Bel Cruz \"Haven\"  MRN: [id number]  Today's Date: 7/25/2024   Time Calculation  Start Time: 0857  Stop Time: 0912  Time Calculation (min): 15 min  316/316-A    Assessment/Plan   PT Assessment  PT Assessment Results: Decreased strength, Decreased endurance, Impaired balance, Decreased mobility, Decreased safety awareness  Rehab Prognosis: Fair  End of Session Communication: Bedside nurse  Assessment Comment: Pt presents with decreased functional mobility secondary to weakness, decreased balance, and decreased safety awareness. Pt will benefit from continued PT to address above deficits.  End of Session Patient Position: Up in chair, Alarm on (call light in reach)  IP OR SWING BED PT PLAN  Inpatient or Swing Bed: Inpatient  PT Plan  Treatment/Interventions: Bed mobility, Transfer training, Gait training, Balance training, Neuromuscular re-education, Strengthening, Endurance training, Range of motion, Therapeutic exercise, Therapeutic activity, Home exercise program  PT Plan: Ongoing PT  PT Frequency: 4 times per week  PT Discharge Recommendations: Moderate intensity level of continued care  PT Recommended Transfer Status: Assist x1, Assistive device (stand pivot transfer)  PT - OK to Discharge: Yes (PT eval complete, ok to d/c once deemed medically appropriate.)    Subjective     Current Problem:  1. Weakness        2. Hypokalemia        3. Frequent falls        4. Closed head injury, initial encounter          Patient Active Problem List   Diagnosis    Weakness generalized    Weakness       General Visit Information:  General  Reason for Referral: impaired mobility  Referred By: Bj  Past Medical History Relevant to Rehab: COPD, MI, CVA, CHF, metastatic pancreatic cancer, smoker  Family/Caregiver Present: No  Co-Treatment: OT  Prior to Session Communication: Bedside nurse  Patient Position Received: Bed, 3 rail up, Alarm on  General " Comment: 83 y.o. female with a past medical history of COPD, CHF chronic hypoxia lung cancer, MI, CVA, and pancreatic cancer presented to the emergency room from nursing home for a fall.  Last known well prior to arrival was 2 hours.  The patient is a hospice patient. CT brain, cervical spine, chest abdomen pelvis shows no traumatic injury. Urine without infection. Lab work shows a hypokalemia. Malnutrition. Treated with 1 L normal saline and IV potassium    Home Living:  Home Living  Home Living Comments: Pt is from [facility], was receiving hospice care. Received assist for all ADLs and IADLs. Pt stating PTA she transferred to wheelchair with FWW. 1 fall PTA prompting admission.    Precautions:  Precautions  Hearing/Visual Limitations: [place]  Medical Precautions: Fall precautions, Oxygen therapy device and L/min    Vital Signs:     Objective       Cognition:  Cognition  Following Commands: Follows one step commands with increased time  Processing Speed: Delayed    General Assessments:      Activity Tolerance  Endurance: Decreased tolerance for upright activites     Strength  Strength Comments: BLE MMT at least 3+/5 overall        Postural Control  Posture Comment: Forward head/rounded shoulders  Static Sitting Balance  Static Sitting-Comment/Number of Minutes: Good  Dynamic Sitting Balance  Dynamic Sitting-Comments: Good  Static Standing Balance  Static Standing-Comment/Number of Minutes: Fair  Dynamic Standing Balance  Dynamic Standing-Comments: Fair    Functional Assessments:     Bed Mobility  Bed Mobility: Yes  Bed Mobility 1  Bed Mobility 1: Supine to sitting  Level of Assistance 1:  (SBA)  Bed Mobility Comments 1: HOB elevated ~30 degrees. Cues to bring feet over EOB and to scoot towards EOB.  Transfers  Transfer: Yes  Transfer 1  Transfer From 1: Bed to  Transfer to 1: Chair with arms  Technique 1: Stand pivot  Transfer Device 1:  (FWW)  Transfer Level of Assistance 1:  (SBA)  Trials/Comments 1: SBA for  stand pivot transfer to chair.  Ambulation/Gait Training  Ambulation/Gait Training Performed: Yes  Ambulation/Gait Training 1  Surface 1: Level tile  Device 1: Rolling walker  Gait Support Devices: Gait belt  Assistance 1:  (SBA)  Quality of Gait 1: Forward flexed posture, Shuffling gait, Decreased step length  Comments/Distance (ft) 1: Pt takes ~3 small sidesteps to chair with SBA.          Extremity/Trunk Assessments:        RLE   RLE :  (ROM WFL. Tends to keep R hip in ER leading to toe out positioning.)  LLE   LLE :  (ROM WFL.)    Outcome Measures:     [facility] Basic Mobility  Turning from your back to your side while in a flat bed without using bedrails: A little  Moving from lying on your back to sitting on the side of a flat bed without using bedrails: A little  Moving to and from bed to chair (including a wheelchair): A little  Standing up from a chair using your arms (e.g. wheelchair or bedside chair): A little  To walk in hospital room: A little  Climbing 3-5 steps with railing: Total  Basic Mobility - Total Score: 16          Goals:  Encounter Problems       Encounter Problems (Active)       PT Problem       Pt will demonstrate sup > sit and sit > sup bed mobility mod I  (Progressing)       Start:  07/25/24    Expected End:  08/08/24            Pt will demo stand pivot transfer with FWW and mod I  (Progressing)       Start:  07/25/24    Expected End:  08/08/24            Pt will demonstrate ability to tolerate 8 minutes of seated or standing therapeutic exercise to demonstrate improved activity tolerance.  (Progressing)       Start:  07/25/24    Expected End:  08/08/24            Pt will demo seated balance at EOB for 5 minutes  (Progressing)       Start:  07/25/24    Expected End:  08/08/24                 Education Documentation  Mobility Training, taught by Amanda Blair, PT at 7/25/2024  1:07 PM.  Learner: Patient  Readiness: Acceptance  Method: Explanation  Response: Needs Reinforcement  Comment:  Educated pt on PT POC and recommendation for assistive device.

## 2024-07-25 NOTE — PROGRESS NOTES
Occupational Therapy    Evaluation    Patient Name: Bel Cruz  MRN: [id number]  Today's Date: 7/25/2024     316/316-A    Assessment  IP OT Assessment  OT Assessment: pt presents with deficits in strength, balance, activity tolerance, safety/judgement which impeded ability to perform ADL at PLOF.  Recommend OT services in order to ensure return to PLOF.  End of Session Communication: Bedside nurse  End of Session Patient Position: Up in chair, Alarm on    Plan:  Treatment Interventions: Functional transfer training, ADL retraining, UE strengthening/ROM, Endurance training, Equipment evaluation/education, Neuromuscular reeducation, Patient/family training, Compensatory technique education  OT Frequency: 5 times per week  OT Discharge Recommendations: Moderate intensity level of continued care    Subjective     Current Problem:  1. Weakness        2. Hypokalemia        3. Frequent falls        4. Closed head injury, initial encounter            General:  General  Reason for Referral: 83 y.o. female with a past medical history of COPD, CHF chronic hypoxia lung cancer, MI, CVA, and pancreatic cancer presented to the emergency room from nursing home for a fall. Last known well prior to arrival was 2 hours. The patient is a hospice patient. CT brain, cervical spine, chest abdomen pelvis shows no traumatic injury. Urine without infection. Lab work shows a hypokalemia. Malnutrition. Treated with 1 L normal saline and IV potassium  Referred By: Bj  Past Medical History Relevant to Rehab: COPD, MI, CVA, CHF, metastatic pancreatic cancer, smoker  Family/Caregiver Present: No  Co-Treatment: PT  Patient Position Received: Bed, 3 rail up, Alarm on  General Comment: pt agreeable to session and wanting to get out of bed    Precautions:  Hearing/Visual Limitations: [place]  Medical Precautions: Fall precautions, Oxygen therapy device and L/min      Pain:  Pain Assessment  Pain Assessment: 0-10  0-10 (Numeric) Pain Score:  0 - No pain    Objective     Cognition:  Orientation Level: Disoriented to time, Disoriented to situation  Following Commands: Follows one step commands without difficulty  Processing Speed: Delayed             Home Living:  Type of Home: Assisted living ([facility])     Prior Function:  Prior Function Comments: pt states that she is nonambulatry but is able to transfer to and from a  MI.   pt states she is able to perform wc mobility MI.  pt states that she has shower assist but they are usually only there for supervision.  pt states MI in getting dressed and going to the dinning room for meals.      ADL:  Eating Assistance: Stand by  Grooming Assistance: Minimal  Bathing Assistance: Moderate  UE Dressing Assistance: Minimal  LE Dressing Assistance: Moderate  Toileting Assistance with Device: Moderate    Activity Tolerance:  Endurance: Decreased tolerance for upright activites    Bed Mobility/Transfers:   Bed Mobility  Bed Mobility: Yes  Bed Mobility 1  Bed Mobility 1: Supine to sitting  Level of Assistance 1:  (SBA with use of bed rail)  Bed Mobility Comments 1: HOB elevated  Transfers  Transfer: Yes  Transfer 1  Transfer From 1: Bed to  Transfer to 1: Chair with arms  Technique 1: Stand pivot  Transfer Device 1: Walker  Transfer Level of Assistance 1: Close supervision    Ambulation/Gait Training:  Functional Mobility  Functional Mobility Performed: No      Vision: Vision - Basic Assessment  Current Vision: No visual deficits         Strength:  Strength Comments: BUE 4-      Outcome Measures: [facility] Daily Activity  Putting on and taking off regular lower body clothing: A lot  Bathing (including washing, rinsing, drying): A lot  Putting on and taking off regular upper body clothing: A little  Toileting, which includes using toilet, bedpan or urinal: A lot  Taking care of personal grooming such as brushing teeth: A lot  Eating Meals: A little  Daily Activity - Total Score: 14                    EDUCATION:      Education Documentation  Body Mechanics, taught by Elina Booth OT at 7/25/2024  1:12 PM.  Learner: Patient  Readiness: Eager  Method: Demonstration, Explanation  Response: Demonstrated Understanding, Needs Reinforcement, Verbalizes Understanding    ADL Training, taught by Elina Booth OT at 7/25/2024  1:12 PM.  Learner: Patient  Readiness: Eager  Method: Demonstration, Explanation  Response: Demonstrated Understanding, Needs Reinforcement, Verbalizes Understanding    Education Comments  No comments found.        Goals:   Encounter Problems       Encounter Problems (Active)       ADLs       Patient will perform UB and LB bathing  with minimal assist  level of assistance . (Progressing)       Start:  07/25/24    Expected End:  08/14/24            Patient with complete lower body dressing with minimal assist  level of assistance   (Progressing)       Start:  07/25/24    Expected End:  08/14/24            Patient will complete toileting including hygiene clothing management/hygiene with minimal assist  level of assistance. (Progressing)       Start:  07/25/24    Expected End:  08/14/24               BALANCE       Patient will tolerate standing for 2 minutes to stand by assist level of assistance  (Progressing)       Start:  07/25/24    Expected End:  08/14/24               TRANSFERS       Patient will complete functional transfers with least restrictive device with modified independent level of assistance. (Progressing)       Start:  07/25/24    Expected End:  08/14/24

## 2024-07-25 NOTE — PROGRESS NOTES
Bel Cruz is a 83 y.o. female on day 0 of admission presenting with Weakness generalized.      Subjective   Patient attempting to get out of bed per self.  She is stating she has to use the restroom.  She is awake oriented to self only.  She appears weak and frail but in no acute distress.       Objective     Last Recorded Vitals  /61 (BP Location: Right arm, Patient Position: Lying)   Pulse 91   Temp 36.7 °C (98.1 °F) (Temporal)   Resp 14   Wt 45.4 kg (100 lb)   SpO2 97%   Intake/Output last 3 Shifts:    Intake/Output Summary (Last 24 hours) at 7/25/2024 0931  Last data filed at 7/25/2024 0900  Gross per 24 hour   Intake 60 ml   Output 175 ml   Net -115 ml       Admission Weight  Weight: 45.4 kg (100 lb) (07/24/24 0810)    Daily Weight  07/24/24 : 45.4 kg (100 lb)    Image Results  XR chest 1 view  Narrative: Interpreted By:  Jeffery Aguilar,   STUDY:  XR CHEST 1 VIEW  7/25/2024 8:24 am      INDICATION:  Signs/Symptoms:cough sob      COMPARISON:  02/01/2024      ACCESSION NUMBER(S):  [id number]      ORDERING CLINICIAN:  DONNA SOLANO      TECHNIQUE:  A single AP portable radiograph of the chest was obtained.      FINDINGS:  Multiple cardiac monitoring leads are seen over the chest.  Airspace  consolidation is seen over the left upper lung, slightly increased in  extent from the prior study, and may represent scarring, atelectasis  and/or pneumonia. Left basilar airspace consolidation is seen and may  represent small pleural effusion, atelectasis and/or pneumonia. No  pneumothorax is identified. The cardiac silhouette is within normal  limits for size.      Impression: Left-sided airspace consolidations, as above. Clinical correlation  and continued follow-up until clearing is recommended.      MACRO:  None.      Signed by: Jeffery Aguilar 7/25/2024 9:12 AM  Dictation workstation:   [id number]      Physical Exam   Appearance: Alert. cooperative, frail cachectic  Skin: Intact,  dry skin, no  lesions, rash, petechiae or purpura.   Eyes: PERRLA, EOMs intact,  Conjunctiva pink with no redness or exudates.   HENT: Normocephalic, atraumatic. Nares patent. No intraoral lesions.  Dry mucous membranes.  Neck: Supple, without meningismus. Trachea at midline. No lymphadenopathy.  C-collar in place.  Pulmonary: Coarse breath sounds bilateral excursion.   Cardiac: Regular rate and rhythm, no rubs, murmurs, or gallops.   Abdomen: Abdomen is soft, nontender, and nondistended.  No palpable organomegaly.  No rebound or guarding.    Musculoskeletal: Full range of motion.  Pulses full and equal. No cyanosis, clubbing, or edema.  Neurological:  Cranial nerves are grossly intact, grossly normal sensation, no weakness, no focal findings identified.  Psychiatric: Appropriate mood and affect  Relevant Results     Results for orders placed or performed during the hospital encounter of 07/24/24 (from the past 24 hour(s))   Urinalysis with Reflex Culture and Microscopic   Result Value Ref Range    Color, Urine Yellow Light-Yellow, Yellow, Dark-Yellow    Appearance, Urine Clear Clear    Specific Gravity, Urine 1.027 1.005 - 1.035    pH, Urine 7.5 5.0, 5.5, 6.0, 6.5, 7.0, 7.5, 8.0    Protein, Urine NEGATIVE NEGATIVE, 10 (TRACE), 20 (TRACE) mg/dL    Glucose, Urine Normal Normal mg/dL    Blood, Urine NEGATIVE NEGATIVE    Ketones, Urine NEGATIVE NEGATIVE mg/dL    Bilirubin, Urine NEGATIVE NEGATIVE    Urobilinogen, Urine 2 (1+) (A) Normal mg/dL    Nitrite, Urine NEGATIVE NEGATIVE    Leukocyte Esterase, Urine NEGATIVE NEGATIVE   Extra Urine Gray Tube   Result Value Ref Range    Extra Tube Hold for add-ons.    CBC   Result Value Ref Range    WBC 26.4 (H) 4.4 - 11.3 x10*3/uL    nRBC 0.0 0.0 - 0.0 /100 WBCs    RBC 3.56 (L) 4.00 - 5.20 x10*6/uL    Hemoglobin 10.6 (L) 12.0 - 16.0 g/dL    Hematocrit 32.1 (L) 36.0 - 46.0 %    MCV 90 80 - 100 fL    MCH 29.8 26.0 - 34.0 pg    MCHC 33.0 32.0 - 36.0 g/dL    RDW 14.5 11.5 - 14.5 %    Platelets 270  150 - 450 x10*3/uL   Basic Metabolic Panel   Result Value Ref Range    Glucose 79 74 - 99 mg/dL    Sodium 132 (L) 136 - 145 mmol/L    Potassium 3.7 3.5 - 5.3 mmol/L    Chloride 98 98 - 107 mmol/L    Bicarbonate 28 21 - 32 mmol/L    Anion Gap 10 10 - 20 mmol/L    Urea Nitrogen 15 6 - 23 mg/dL    Creatinine 0.42 (L) 0.50 - 1.05 mg/dL    eGFR >90 >60 mL/min/1.73m*2    Calcium 7.9 (L) 8.6 - 10.3 mg/dL       Assessment/Plan        Bel Cruz is a 83 y.o. female with a past medical history of COPD, CHF chronic hypoxia lung cancer, MI, CVA, and pancreatic cancer presented to the emergency room from nursing home for a fall.  Last known well prior to arrival was 2 hours.  The patient is a hospice patient.  Her daughter who is POA advised she did not want a workup.CT brain, cervical spine, chest abdomen pelvis shows no traumatic injury. Urine without infection. Lab work shows a hypokalemia. Malnutrition. Treated with 1 L normal saline and IV potassium Discussed again with daughter who agrees patient will likely need placement in a nursing home can resume hospice.     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  Leukocytosis worse today.  Repeat CBC in a.m.  -Chest x-ray today which revealed left-sided consolidation.  Will start on IV Rocephin and azithromycin.  -Strep pneumonia and Legionella antigens added.  -Procalcitonin added  -CT shows bilateral bronchial wall thickening  -New 8 mm right apical subpleural nodule  -Negative for consolidation, pneumothorax or pleural effusion     Pancreatic malignancy  -Plan at this time is to transition back to hospice at SNF  -Pain and anxiety medications available        Protein malnutrition  -Dietitian consult     Extensive colonic stool with  fecal impaction  -Patient had a moderate bowel movement upon arrival to room  -Continue aggressive bowel regimen     DVT prophylaxis SCDs and Lovenox     Disposition return to SNF on hospice care when medically stable 24 to 48 hours          Елена Lorenzo, APRN-CNP

## 2024-07-25 NOTE — CARE PLAN
Problem: Nutrition  Goal: Oral intake greater than 50%  Outcome: Not Progressing  Goal: Oral intake greater 75%  Outcome: Not Progressing  Goal: Consume prescribed supplement  Outcome: Not Progressing  Goal: Nutrition support goals are met within 48 hrs  Outcome: Not Progressing  Goal: Nutrition support is meeting 75% of nutrient needs  Outcome: Not Progressing     Problem: Skin  Goal: Decreased wound size/increased tissue granulation at next dressing change  Outcome: Progressing  Flowsheets (Taken 7/25/2024 0912)  Decreased wound size/increased tissue granulation at next dressing change: Promote sleep for wound healing  Goal: Participates in plan/prevention/treatment measures  Outcome: Progressing  Flowsheets (Taken 7/25/2024 0912)  Participates in plan/prevention/treatment measures:   Discuss with provider PT/OT consult   Increase activity/out of bed for meals  Goal: Prevent/manage excess moisture  Outcome: Progressing  Flowsheets (Taken 7/25/2024 0912)  Prevent/manage excess moisture:   Cleanse incontinence/protect with barrier cream   Monitor for/manage infection if present   Moisturize dry skin  Goal: Prevent/minimize sheer/friction injuries  Outcome: Progressing  Flowsheets (Taken 7/25/2024 0912)  Prevent/minimize sheer/friction injuries:   Use pull sheet   Complete micro-shifts as needed if patient unable. Adjust patient position to relieve pressure points, not a full turn   HOB 30 degrees or less   Turn/reposition every 2 hours/use positioning/transfer devices  Goal: Promote/optimize nutrition  Outcome: Progressing  Flowsheets (Taken 7/25/2024 0912)  Promote/optimize nutrition:   Consume > 50% meals/supplements   Monitor/record intake including meals   Assist with feeding   Offer water/supplements/favorite foods  Goal: Promote skin healing  Outcome: Progressing  Flowsheets (Taken 7/25/2024 0912)  Promote skin healing:   Assess skin/pad under line(s)/device(s)   Turn/reposition every 2 hours/use  positioning/transfer devices     Problem: Pain - Adult  Goal: Verbalizes/displays adequate comfort level or baseline comfort level  Outcome: Progressing     Problem: Safety - Adult  Goal: Free from fall injury  Outcome: Progressing     Problem: Discharge Planning  Goal: Discharge to home or other facility with appropriate resources  Outcome: Progressing     Problem: Chronic Conditions and Co-morbidities  Goal: Patient's chronic conditions and co-morbidity symptoms are monitored and maintained or improved  Outcome: Progressing     Problem: Fall/Injury  Goal: Not fall by end of shift  Outcome: Progressing  Goal: Be free from injury by end of the shift  Outcome: Progressing  Goal: Verbalize understanding of personal risk factors for fall in the hospital  Outcome: Progressing  Goal: Verbalize understanding of risk factor reduction measures to prevent injury from fall in the home  Outcome: Progressing  Goal: Use assistive devices by end of the shift  Outcome: Progressing  Goal: Pace activities to prevent fatigue by end of the shift  Outcome: Progressing     Problem: Nutrition  Goal: Less than 5 days NPO/clear liquids  Outcome: Progressing  Goal: Adequate PO fluid intake  Outcome: Progressing  Goal: BG  mg/dL  Outcome: Progressing  Goal: Lab values WNL  Outcome: Progressing  Goal: Electrolytes WNL  Outcome: Progressing  Goal: Promote healing  Outcome: Progressing  Goal: Maintain stable weight  Outcome: Progressing  Goal: Reduce weight from edema/fluid  Outcome: Progressing  Goal: Gradual weight gain  Outcome: Progressing    The clinical goals for the shift include pain management, encourage adequate dietary intake      Problem: Nutrition  Goal: Oral intake greater than 50%  Outcome: Not Progressing  Goal: Oral intake greater 75%  Outcome: Not Progressing  Goal: Consume prescribed supplement  Outcome: Not Progressing  Goal: Nutrition support goals are met within 48 hrs  Outcome: Not Progressing  Goal: Nutrition  support is meeting 75% of nutrient needs  Outcome: Not Progressing

## 2024-07-25 NOTE — PROGRESS NOTES
07/25/24 1546   Discharge Planning   Living Arrangements Other (Comment)  ([facility])   Support Systems Family members;Other (Comment)  ([facility] staff)   Assistance Needed Assist with ADL's/IADL's   Type of Residence Assisted living   Do you have animals or pets at home? No   Care Facility Name [facility]   Who is requesting discharge planning? Provider   Home or Post Acute Services Post acute facilities (Rehab/SNF/etc)   Type of Post Acute Facility Services Skilled nursing;Long term care   Expected Discharge Disposition SNF   Does the patient need discharge transport arranged? Yes   RoundTrip coordination needed? Yes   Has discharge transport been arranged? No   What day is the transport expected? 07/28/24   Patient Choice   Provider Choice list and CMS website (https://medicare.gov/care-compare#search) for post-acute Quality and Resource Measure Data were provided and reviewed with: Family   Patient / Family choosing to utilize agency / facility established prior to hospitalization No     SW reviewed chart and attempted to meet with pt to complete the assessment; however, she is intermittently confused and [place]. Permission granted to contact daughter.  Call placed to daughter/Nimisha to complete assessment. Daughter was educated to the SW roles in the discharge planning process. Prior to hospitalization, pt was residing at [facility] with services from [facility] which were revoked to seek treatment.  Daughter lives in [place], but does speak to pt and facility regularly.  Pt requires assistance with all care/activities. She has a wheelchair, lift chair, walker, and oxygen.  Daughter is unsure if pt can return to [facility] following hospitalization.  She has been talking to the facility about pt's increase in care needs and possibly the need for LTC.  Pt is debilitated and weak, however, so they would like to consider skilled care before going back on hospice.  Per freedom of  choice, referrals attached in CarePort in no particular order to [facility], [facility], and [facility].  With the possibility of pt needing LTC after skilled stay, daughter requested referrals be made to the [facility] as well.  Referrals attached with a 15 mile radius of [place], [place]; awaiting replies. Care Transitions will continue to follow. OLU Mendez    ADDM:  Pt is listed at [facility] which would typically require auth for SNF; however, since she revoked hospice services mid- month, she should be Traditional  through 7/31/24- will need to verify with DSC and facilities. Care Transitions will continue to follow.     ADDM: Several facilities are asking for hospice revocation letter.  Call placed to [facility] and spoke to Alisha.  She will fax the letter to [place] fax [phone]; however, still has not arrived end of day.  Revocation letter will need uploaded in CarePort.  Care Transitions will continue to follow.     ADDM: Hospice revocation letter received via fax and attached in CarePort. Care Transitions will follow.

## 2024-07-26 LAB
ANION GAP SERPL CALC-SCNC: 9 MMOL/L (ref 10–20)
BUN SERPL-MCNC: 16 MG/DL (ref 6–23)
CALCIUM SERPL-MCNC: 7.8 MG/DL (ref 8.6–10.3)
CHLORIDE SERPL-SCNC: 96 MMOL/L (ref 98–107)
CO2 SERPL-SCNC: 28 MMOL/L (ref 21–32)
CREAT SERPL-MCNC: 0.38 MG/DL (ref 0.5–1.05)
EGFRCR SERPLBLD CKD-EPI 2021: >90 ML/MIN/1.73M*2
ERYTHROCYTE [DISTWIDTH] IN BLOOD BY AUTOMATED COUNT: 14.6 % (ref 11.5–14.5)
GLUCOSE SERPL-MCNC: 86 MG/DL (ref 74–99)
HCT VFR BLD AUTO: 28.1 % (ref 36–46)
HGB BLD-MCNC: 9 G/DL (ref 12–16)
LEGIONELLA AG UR QL: NEGATIVE
MCH RBC QN AUTO: 29.3 PG (ref 26–34)
MCHC RBC AUTO-ENTMCNC: 32 G/DL (ref 32–36)
MCV RBC AUTO: 92 FL (ref 80–100)
NRBC BLD-RTO: 0 /100 WBCS (ref 0–0)
PLATELET # BLD AUTO: 240 X10*3/UL (ref 150–450)
POTASSIUM SERPL-SCNC: 3.5 MMOL/L (ref 3.5–5.3)
RBC # BLD AUTO: 3.07 X10*6/UL (ref 4–5.2)
S PNEUM AG UR QL: NEGATIVE
SODIUM SERPL-SCNC: 129 MMOL/L (ref 136–145)
WBC # BLD AUTO: 14.2 X10*3/UL (ref 4.4–11.3)

## 2024-07-26 PROCEDURE — 97530 THERAPEUTIC ACTIVITIES: CPT | Mod: GP

## 2024-07-26 PROCEDURE — 2500000005 HC RX 250 GENERAL PHARMACY W/O HCPCS: Performed by: EMERGENCY MEDICINE

## 2024-07-26 PROCEDURE — 2500000001 HC RX 250 WO HCPCS SELF ADMINISTERED DRUGS (ALT 637 FOR MEDICARE OP): Performed by: NURSE PRACTITIONER

## 2024-07-26 PROCEDURE — 80048 BASIC METABOLIC PNL TOTAL CA: CPT | Performed by: NURSE PRACTITIONER

## 2024-07-26 PROCEDURE — 85027 COMPLETE CBC AUTOMATED: CPT | Performed by: NURSE PRACTITIONER

## 2024-07-26 PROCEDURE — 36415 COLL VENOUS BLD VENIPUNCTURE: CPT | Performed by: NURSE PRACTITIONER

## 2024-07-26 PROCEDURE — 97530 THERAPEUTIC ACTIVITIES: CPT | Mod: GO

## 2024-07-26 PROCEDURE — 2500000005 HC RX 250 GENERAL PHARMACY W/O HCPCS: Performed by: NURSE PRACTITIONER

## 2024-07-26 PROCEDURE — 94640 AIRWAY INHALATION TREATMENT: CPT

## 2024-07-26 PROCEDURE — 2500000002 HC RX 250 W HCPCS SELF ADMINISTERED DRUGS (ALT 637 FOR MEDICARE OP, ALT 636 FOR OP/ED): Performed by: NURSE PRACTITIONER

## 2024-07-26 PROCEDURE — 94761 N-INVAS EAR/PLS OXIMETRY MLT: CPT

## 2024-07-26 PROCEDURE — 1100000001 HC PRIVATE ROOM DAILY

## 2024-07-26 PROCEDURE — 2500000004 HC RX 250 GENERAL PHARMACY W/ HCPCS (ALT 636 FOR OP/ED): Performed by: NURSE PRACTITIONER

## 2024-07-26 ASSESSMENT — PAIN SCALES - GENERAL
PAINLEVEL_OUTOF10: 6
PAINLEVEL_OUTOF10: 0 - NO PAIN

## 2024-07-26 ASSESSMENT — COGNITIVE AND FUNCTIONAL STATUS - GENERAL
MOBILITY SCORE: 16
MOBILITY SCORE: 19
TOILETING: A LOT
DRESSING REGULAR LOWER BODY CLOTHING: A LOT
PERSONAL GROOMING: A LITTLE
TOILETING: A LOT
DAILY ACTIVITIY SCORE: 16
HELP NEEDED FOR BATHING: A LOT
STANDING UP FROM CHAIR USING ARMS: A LITTLE
HELP NEEDED FOR BATHING: A LOT
WALKING IN HOSPITAL ROOM: A LITTLE
CLIMB 3 TO 5 STEPS WITH RAILING: A LOT
MOVING FROM LYING ON BACK TO SITTING ON SIDE OF FLAT BED WITH BEDRAILS: A LITTLE
DRESSING REGULAR LOWER BODY CLOTHING: A LOT
DRESSING REGULAR UPPER BODY CLOTHING: A LITTLE
MOVING TO AND FROM BED TO CHAIR: A LITTLE
TURNING FROM BACK TO SIDE WHILE IN FLAT BAD: A LOT
MOVING TO AND FROM BED TO CHAIR: A LITTLE
WALKING IN HOSPITAL ROOM: A LITTLE
STANDING UP FROM CHAIR USING ARMS: A LITTLE
PERSONAL GROOMING: A LITTLE
DRESSING REGULAR UPPER BODY CLOTHING: A LITTLE
CLIMB 3 TO 5 STEPS WITH RAILING: A LOT
DAILY ACTIVITIY SCORE: 16

## 2024-07-26 ASSESSMENT — PAIN SCALES - PAIN ASSESSMENT IN ADVANCED DEMENTIA (PAINAD): TOTALSCORE: MEDICATION (SEE MAR)

## 2024-07-26 ASSESSMENT — PAIN - FUNCTIONAL ASSESSMENT
PAIN_FUNCTIONAL_ASSESSMENT: 0-10

## 2024-07-26 ASSESSMENT — PAIN DESCRIPTION - LOCATION: LOCATION: BACK

## 2024-07-26 ASSESSMENT — PAIN DESCRIPTION - DESCRIPTORS: DESCRIPTORS: ACHING

## 2024-07-26 NOTE — CARE PLAN
Problem: Skin  Goal: Decreased wound size/increased tissue granulation at next dressing change  Outcome: Progressing  Goal: Participates in plan/prevention/treatment measures  Outcome: Progressing  Goal: Prevent/manage excess moisture  Outcome: Progressing  Goal: Prevent/minimize sheer/friction injuries  Outcome: Progressing  Goal: Promote/optimize nutrition  Outcome: Progressing  Goal: Promote skin healing  Outcome: Progressing     Problem: Pain - Adult  Goal: Verbalizes/displays adequate comfort level or baseline comfort level  Outcome: Progressing     Problem: Safety - Adult  Goal: Free from fall injury  Outcome: Progressing     Problem: Discharge Planning  Goal: Discharge to home or other facility with appropriate resources  Outcome: Progressing     Problem: Chronic Conditions and Co-morbidities  Goal: Patient's chronic conditions and co-morbidity symptoms are monitored and maintained or improved  Outcome: Progressing     Problem: Fall/Injury  Goal: Not fall by end of shift  Outcome: Progressing  Goal: Be free from injury by end of the shift  Outcome: Progressing  Goal: Verbalize understanding of personal risk factors for fall in the hospital  Outcome: Progressing  Goal: Verbalize understanding of risk factor reduction measures to prevent injury from fall in the home  Outcome: Progressing  Goal: Use assistive devices by end of the shift  Outcome: Progressing  Goal: Pace activities to prevent fatigue by end of the shift  Outcome: Progressing     Problem: Nutrition  Goal: Oral intake greater than 50%  Outcome: Progressing  Goal: Consume prescribed supplement  Outcome: Progressing  Goal: Adequate PO fluid intake  Outcome: Progressing  Goal: BG  mg/dL  Outcome: Progressing  Goal: Lab values WNL  Outcome: Progressing  Goal: Maintain stable weight  Outcome: Progressing   The patient's goals for the shift include      The clinical goals for the shift include pain management, encourage adequate dietary  intake

## 2024-07-26 NOTE — PROGRESS NOTES
07/26/24 1116   Discharge Planning   Home or Post Acute Services Post acute facilities (Rehab/SNF/etc)     Spoke to daughter, role of TCC explained. Per daughter [facility]OC for pt would be [facility] but daughter would prefer #1- [facility] #2- [facility]. Daughter reports living in close proximity to same and has read positive reviews on both. [facility] cannot accept due to no beds.  #1- [facility]- no response. Called left VM with ITZEL Garrido requesting careport response or return call as they are FOC. Awaiting response.   #2- [facility]- can accept and daughters second choice if [facility] cannot accept.  ADOD per care rounds is tomorrow. Per Bubba note on 7/25- pt has revoked hospice and should therefore be traditional medicare through 7/31. Reached out to Laura Luo to clarify if pt will now go under traditional medicare and require 3midnight stay or if remains medical mutual and requires precert. CT will follow.     1125- Pt is traditional medicare through 7/31- requires 3 midnight stay. Will have met 3 midnight stay and can discharge on 7/28. CT will follow.     1500- Per ITZEL, Patient is on an Assisted Living Waiver, and her  is Melisa Westfall, [phone]. Called and left VM as requested by ITZEL. Awaiting return call. CT will follow.     1515- Called [facility] SNF again, spoke to Radhika. Radhika reports that they do not have any Long term beds and they wont have any short term beds til next week, possibly Tuesday or Wednesday. ADOD Sunday once 3 midnight stay is met. Spoke to daughter notified of same. [facility]OC is now [facility], notified [facility] of same. Daughter would like notified of ETA. CT will follow.

## 2024-07-26 NOTE — CARE PLAN
Problem: Skin  Goal: Decreased wound size/increased tissue granulation at next dressing change  Outcome: Progressing  Goal: Participates in plan/prevention/treatment measures  Outcome: Progressing  Goal: Prevent/manage excess moisture  Outcome: Progressing  Goal: Prevent/minimize sheer/friction injuries  7/26/2024 0217 by Corrine David RN  Flowsheets (Taken 7/26/2024 0217)  Prevent/minimize sheer/friction injuries: Use pull sheet  7/26/2024 0217 by Corrine David RN  Outcome: Progressing  Flowsheets (Taken 7/26/2024 0217)  Prevent/minimize sheer/friction injuries: Use pull sheet  Goal: Promote/optimize nutrition  Outcome: Progressing  Goal: Promote skin healing  Outcome: Progressing     Problem: Pain - Adult  Goal: Verbalizes/displays adequate comfort level or baseline comfort level  Outcome: Progressing     Problem: Safety - Adult  Goal: Free from fall injury  Outcome: Progressing     Problem: Discharge Planning  Goal: Discharge to home or other facility with appropriate resources  Outcome: Progressing     Problem: Chronic Conditions and Co-morbidities  Goal: Patient's chronic conditions and co-morbidity symptoms are monitored and maintained or improved  Outcome: Progressing     Problem: Fall/Injury  Goal: Not fall by end of shift  Outcome: Progressing  Goal: Be free from injury by end of the shift  Outcome: Progressing  Goal: Verbalize understanding of personal risk factors for fall in the hospital  Outcome: Progressing  Goal: Verbalize understanding of risk factor reduction measures to prevent injury from fall in the home  Outcome: Progressing  Goal: Use assistive devices by end of the shift  Outcome: Progressing  Goal: Pace activities to prevent fatigue by end of the shift  Outcome: Progressing     Problem: Nutrition  Goal: Oral intake greater than 50%  Outcome: Progressing  Goal: Consume prescribed supplement  Outcome: Progressing  Goal: Adequate PO fluid intake  Outcome: Progressing  Goal: BG   mg/dL  Outcome: Progressing  Goal: Lab values WNL  Outcome: Progressing  Goal: Maintain stable weight  Outcome: Progressing   The patient's goals for the shift include      The clinical goals for the shift include pain management, encourage adequate dietary intake

## 2024-07-26 NOTE — CARE PLAN
Problem: Skin  Goal: Decreased wound size/increased tissue granulation at next dressing change  Outcome: Progressing  Flowsheets (Taken 7/26/2024 1143)  Decreased wound size/increased tissue granulation at next dressing change: Promote sleep for wound healing  Goal: Participates in plan/prevention/treatment measures  Outcome: Progressing  Flowsheets (Taken 7/26/2024 1143)  Participates in plan/prevention/treatment measures: Increase activity/out of bed for meals  Goal: Prevent/manage excess moisture  Outcome: Progressing  Flowsheets (Taken 7/26/2024 1143)  Prevent/manage excess moisture: Moisturize dry skin  Goal: Prevent/minimize sheer/friction injuries  Outcome: Progressing  Flowsheets (Taken 7/26/2024 1143)  Prevent/minimize sheer/friction injuries: Use pull sheet  Goal: Promote/optimize nutrition  Outcome: Progressing  Flowsheets (Taken 7/26/2024 1143)  Promote/optimize nutrition: Consume > 50% meals/supplements  Goal: Promote skin healing  Outcome: Progressing  Flowsheets (Taken 7/26/2024 1143)  Promote skin healing: Turn/reposition every 2 hours/use positioning/transfer devices     Problem: Pain - Adult  Goal: Verbalizes/displays adequate comfort level or baseline comfort level  Outcome: Progressing     Problem: Safety - Adult  Goal: Free from fall injury  Outcome: Progressing     Problem: Discharge Planning  Goal: Discharge to home or other facility with appropriate resources  Outcome: Progressing     Problem: Chronic Conditions and Co-morbidities  Goal: Patient's chronic conditions and co-morbidity symptoms are monitored and maintained or improved  Outcome: Progressing     Problem: Fall/Injury  Goal: Not fall by end of shift  Outcome: Progressing  Goal: Be free from injury by end of the shift  Outcome: Progressing  Goal: Verbalize understanding of personal risk factors for fall in the hospital  Outcome: Progressing  Goal: Verbalize understanding of risk factor reduction measures to prevent injury from fall  in the home  Outcome: Progressing  Goal: Use assistive devices by end of the shift  Outcome: Progressing  Goal: Pace activities to prevent fatigue by end of the shift  Outcome: Progressing     Problem: Nutrition  Goal: Oral intake greater than 50%  Outcome: Progressing  Goal: Consume prescribed supplement  Outcome: Progressing  Goal: Adequate PO fluid intake  Outcome: Progressing  Goal: BG  mg/dL  Outcome: Progressing  Goal: Lab values WNL  Outcome: Progressing  Goal: Maintain stable weight  Outcome: Progressing   The patient's goals for the shift include      The clinical goals for the shift include pain management    Over the shift, the patient did not make progress toward the following goals. Barriers to progression include . Recommendations to address these barriers include .

## 2024-07-26 NOTE — PROGRESS NOTES
Occupational Therapy    OT Treatment    Patient Name: Bel Cruz  MRN: [id number]  Today's Date: 7/26/2024  Time Calculation  Start Time: 0758  Stop Time: 0812  Time Calculation (min): 14 min        Assessment:  OT Assessment: pt is fatigued this date and needing slightly more assist than yesterday.  Continue with current OT POC  End of Session Communication:  (NP)  End of Session Patient Position: Up in chair, Alarm on     Plan:  Treatment Interventions: Functional transfer training, ADL retraining, UE strengthening/ROM, Endurance training, Equipment evaluation/education, Neuromuscular reeducation, Patient/family training, Compensatory technique education  OT Frequency: 5 times per week  OT Discharge Recommendations: Moderate intensity level of continued care  Treatment Interventions: Functional transfer training, ADL retraining, UE strengthening/ROM, Endurance training, Equipment evaluation/education, Neuromuscular reeducation, Patient/family training, Compensatory technique education    Subjective   Previous Visit Info:  OT Last Visit  OT Received On: 07/26/24  General:  General  Reason for Referral: 83 y.o. female with a past medical history of COPD, CHF chronic hypoxia lung cancer, MI, CVA, and pancreatic cancer presented to the emergency room from nursing home for a fall. Last known well prior to arrival was 2 hours. The patient is a hospice patient. CT brain, cervical spine, chest abdomen pelvis shows no traumatic injury. Urine without infection. Lab work shows a hypokalemia. Malnutrition. Treated with 1 L normal saline and IV potassium  Referred By: Bj  Past Medical History Relevant to Rehab: COPD, MI, CVA, CHF, metastatic pancreatic cancer, smoker  Missed Visit: No  Co-Treatment: PT  Prior to Session Communication: Bedside nurse  Patient Position Received: Bed, 3 rail up, Alarm on  General Comment: pt asleep upon arrival but easily awakens.  pt wants to get out of bed  Precautions:  Medical  Precautions: Fall precautions, Oxygen therapy device and L/min    Pain: none       Objective      Bed Mobility/Transfers: Bed Mobility  Bed Mobility: Yes  Bed Mobility 1  Bed Mobility 1: Supine to sitting  Level of Assistance 1: Moderate assistance  Bed Mobility Comments 1: pt lethargic    Transfers  Transfer: Yes  Transfer 1  Transfer From 1: Bed to  Transfer to 1: Chair with arms  Technique 1: Stand pivot  Transfer Device 1: Walker  Transfer Level of Assistance 1: Contact guard  Trials/Comments 1: pt is easily fatigued.  Transfers 2  Technique 2: Sit to stand, Stand to sit  Transfer Device 2: Walker  Transfer Level of Assistance 2: Minimum assistance, Contact guard      Outcome Measures:[facility] Daily Activity  Putting on and taking off regular lower body clothing: A lot  Bathing (including washing, rinsing, drying): A lot  Putting on and taking off regular upper body clothing: A little  Toileting, which includes using toilet, bedpan or urinal: A lot  Taking care of personal grooming such as brushing teeth: A little  Eating Meals: None  Daily Activity - Total Score: 16        Education Documentation      Body Mechanics, taught by Elina Booth OT at 7/25/2024  1:12 PM.  Learner: Patient  Readiness: Eager  Method: Demonstration, Explanation  Response: Demonstrated Understanding, Needs Reinforcement, Verbalizes Understanding    ADL Training, taught by Elina Booth OT at 7/25/2024  1:12 PM.  Learner: Patient  Readiness: Eager  Method: Demonstration, Explanation  Response: Demonstrated Understanding, Needs Reinforcement, Verbalizes Understanding      Goals:  Encounter Problems       Encounter Problems (Active)       ADLs       Patient will perform UB and LB bathing  with minimal assist  level of assistance . (Progressing)       Start:  07/25/24    Expected End:  08/14/24            Patient with complete lower body dressing with minimal assist  level of assistance   (Progressing)       Start:  07/25/24    Expected End:   08/14/24            Patient will complete toileting including hygiene clothing management/hygiene with minimal assist  level of assistance. (Progressing)       Start:  07/25/24    Expected End:  08/14/24               BALANCE       Patient will tolerate standing for 2 minutes to stand by assist level of assistance  (Progressing)       Start:  07/25/24    Expected End:  08/14/24               TRANSFERS       Patient will complete functional transfers with least restrictive device with modified independent level of assistance. (Progressing)       Start:  07/25/24    Expected End:  08/14/24

## 2024-07-26 NOTE — PROGRESS NOTES
Physical Therapy    Physical Therapy Treatment    Patient Name: Bel Cruz  MRN: [id number]  Today's Date: 7/26/2024  Time Calculation  Start Time: 0759  Stop Time: 0812  Time Calculation (min): 13 min     316/316-A    Assessment/Plan   PT Assessment  PT Assessment Results: Decreased strength, Decreased endurance, Impaired balance, Decreased mobility, Decreased safety awareness  Rehab Prognosis: Fair  End of Session Communication: Bedside nurse  Assessment Comment: Pt requires increased assist for bed mobility this date compared to previous session. She requires min A for sit > stand from bed > chair however able to perform sit > stand at chair with SBA due to being able to push up from armrests.  End of Session Patient Position: Up in chair, Alarm on  PT Plan  Inpatient/Swing Bed or Outpatient: Inpatient  PT Plan  Treatment/Interventions: Bed mobility, Transfer training, Gait training, Balance training, Neuromuscular re-education, Strengthening, Endurance training, Range of motion, Therapeutic exercise, Therapeutic activity, Home exercise program  PT Plan: Ongoing PT  PT Frequency: 4 times per week  PT Discharge Recommendations: Moderate intensity level of continued care  PT Recommended Transfer Status: Assist x1, Assistive device (stand pivot transfer)  PT - OK to Discharge: Yes (PT eval complete, ok to d/c once deemed medically appropriate.)    Current Problem:  Patient Active Problem List   Diagnosis    Weakness generalized    Weakness       General Visit Information:   PT  Visit  PT Received On: 07/26/24  General  Reason for Referral: 83 y.o. female with a past medical history of COPD, CHF chronic hypoxia lung cancer, MI, CVA, and pancreatic cancer presented to the emergency room from nursing home for a fall. Last known well prior to arrival was 2 hours. The patient is a hospice patient. CT brain, cervical spine, chest abdomen pelvis shows no traumatic injury. Urine without infection. Lab work shows a  hypokalemia. Malnutrition. Treated with 1 L normal saline and IV potassium  Referred By: Bj  Past Medical History Relevant to Rehab: COPD, MI, CVA, CHF, metastatic pancreatic cancer, smoker  Family/Caregiver Present: No  Co-Treatment: OT  Prior to Session Communication: Bedside nurse  Patient Position Received: Bed, 3 rail up, Alarm on  General Comment: Pt in bed asleep, agreeable to getting up to chair for breakfast.  Subjective     Precautions:  Precautions  Hearing/Visual Limitations: [place]  Medical Precautions: Fall precautions, Oxygen therapy device and L/min    Vital Signs:     Objective     Pain:  Pain Assessment  Pain Assessment: 0-10  0-10 (Numeric) Pain Score: 0 - No pain    Cognition:  Cognition  Following Commands: Follows one step commands without difficulty  Processing Speed: Delayed    Postural Control:  Postural Control  Posture Comment: Forward head/rounded shoulders    Extremity/Trunk Assessments:        RLE   RLE :  (ROM WFL. Tends to keep R hip in ER leading to toe out positioning.)  LLE   LLE :  (ROM WFL.)    Activity Tolerance:  Activity Tolerance  Endurance: Decreased tolerance for upright activites    Treatments:  Therapeutic Exercise  Therapeutic Exercise Performed: No        Bed Mobility  Bed Mobility: Yes  Bed Mobility 1  Bed Mobility 1: Supine to sitting  Level of Assistance 1: Moderate assistance  Bed Mobility Comments 1: Mod A for trunk control and scooting hips to EOB  Ambulation/Gait Training  Ambulation/Gait Training Performed: Yes  Ambulation/Gait Training 1  Surface 1: Level tile  Device 1: Rolling walker  Gait Support Devices: Gait belt  Assistance 1:  (SBA)  Quality of Gait 1: Forward flexed posture, Shuffling gait, Decreased step length  Comments/Distance (ft) 1: Pt takes ~3 small steps bed > chair with FWW and SBA.  Transfers  Transfer: Yes  Transfer 1  Transfer From 1: Bed to  Transfer to 1: Chair with arms  Technique 1: Sit to stand, Stand to sit  Transfer Device 1:   (FWW)  Transfer Level of Assistance 1: Minimum assistance  Trials/Comments 1: Min A for lift on 1st rep.  Transfers 2  Transfer From 2: Chair with arms to  Transfer to 2: Chair with arms  Technique 2: Sit to stand, Stand to sit  Transfer Device 2:  (FWW)  Transfer Level of Assistance 2:  (SBA)  Trials/Comments 2: SBA for sit > stand > sit at chair with arms          Outcome Measures:  [facility] Basic Mobility  Turning from your back to your side while in a flat bed without using bedrails: A little  Moving from lying on your back to sitting on the side of a flat bed without using bedrails: A lot  Moving to and from bed to chair (including a wheelchair): A little  Standing up from a chair using your arms (e.g. wheelchair or bedside chair): A little  To walk in hospital room: A little  Climbing 3-5 steps with railing: A lot  Basic Mobility - Total Score: 16  Education Documentation  Mobility Training, taught by Amanda Blair PT at 7/26/2024 10:55 AM.  Learner: Patient  Readiness: Acceptance  Method: Explanation  Response: Verbalizes Understanding    Mobility Training, taught by Amanda Blair PT at 7/25/2024  1:07 PM.  Learner: Patient  Readiness: Acceptance  Method: Explanation  Response: Needs Reinforcement  Comment: Educated pt on PT POC and recommendation for assistive device.        EDUCATION:     Encounter Problems       Encounter Problems (Active)       PT Problem       Pt will demonstrate sup > sit and sit > sup bed mobility mod I  (Progressing)       Start:  07/25/24    Expected End:  08/08/24            Pt will demo stand pivot transfer with FWW and mod I  (Progressing)       Start:  07/25/24    Expected End:  08/08/24            Pt will demonstrate ability to tolerate 8 minutes of seated or standing therapeutic exercise to demonstrate improved activity tolerance.  (Progressing)       Start:  07/25/24    Expected End:  08/08/24            Pt will demo seated balance at EOB for 5 minutes  (Progressing)        Start:  07/25/24    Expected End:  08/08/24

## 2024-07-26 NOTE — PROGRESS NOTES
Bel Cruz is a 83 y.o. female on day 1 of admission presenting with Weakness generalized.      Subjective   Patient awake and oriented x 3 today.  She is not in any acute distress.  She denies shortness of breath but does have a productive cough with clear sputum.       Objective     Last Recorded Vitals  /68 (BP Location: Right arm, Patient Position: Lying)   Pulse 91   Temp 36.6 °C (97.9 °F) (Temporal)   Resp 14   Wt 45.4 kg (100 lb)   SpO2 98%   Intake/Output last 3 Shifts:    Intake/Output Summary (Last 24 hours) at 7/26/2024 1043  Last data filed at 7/25/2024 1745  Gross per 24 hour   Intake 420 ml   Output 125 ml   Net 295 ml       Admission Weight  Weight: 45.4 kg (100 lb) (07/24/24 0810)    Daily Weight  07/24/24 : 45.4 kg (100 lb)    Image Results  ECG 12 lead  Sinus rhythm with Premature supraventricular complexes  Nonspecific intraventricular block  Minimal voltage criteria for LVH, may be normal variant ( [place] product )  Cannot rule out Anterior infarct , age undetermined  Abnormal ECG  When compared with ECG of 01-FEB-2024 13:19,  QRS axis Shifted right  ST elevation now present in Anterior leads  Nonspecific T wave abnormality, worse in Lateral leads  QT has lengthened  See ED provider note for full interpretation and clinical correlation  Confirmed by Nimisha Alexandre ([id number]) on 7/25/2024 10:37:05 AM  XR chest 1 view  Narrative: Interpreted By:  Jeffery Aguilar,   STUDY:  XR CHEST 1 VIEW  7/25/2024 8:24 am      INDICATION:  Signs/Symptoms:cough sob      COMPARISON:  02/01/2024      ACCESSION NUMBER(S):  [id number]      ORDERING CLINICIAN:  DONNA SOLANO      TECHNIQUE:  A single AP portable radiograph of the chest was obtained.      FINDINGS:  Multiple cardiac monitoring leads are seen over the chest.  Airspace  consolidation is seen over the left upper lung, slightly increased in  extent from the prior study, and may represent scarring, atelectasis  and/or pneumonia. Left  basilar airspace consolidation is seen and may  represent small pleural effusion, atelectasis and/or pneumonia. No  pneumothorax is identified. The cardiac silhouette is within normal  limits for size.      Impression: Left-sided airspace consolidations, as above. Clinical correlation  and continued follow-up until clearing is recommended.      MACRO:  None.      Signed by: Jeffery Aguilar 7/25/2024 9:12 AM  Dictation workstation:   [id number]      Physical Exam  Appearance: Alert. cooperative, frail cachectic  Skin: Intact,  dry skin, no lesions, rash, petechiae or purpura.   Eyes: PERRLA, EOMs intact,  Conjunctiva pink with no redness or exudates.   HENT: Normocephalic, atraumatic. Nares patent. No intraoral lesions.  Dry mucous membranes.  Neck: Supple, without meningismus. Trachea at midline. No lymphadenopathy.  C-collar in place.  Pulmonary: Coarse breath sounds bilateral excursion.   Cardiac: Regular rate and rhythm, no rubs, murmurs, or gallops.   Abdomen: Abdomen is soft, nontender, and nondistended.  No palpable organomegaly.  No rebound or guarding.    Musculoskeletal: Full range of motion.  Pulses full and equal. No cyanosis, clubbing, or edema.  Neurological:  Cranial nerves are grossly intact, grossly normal sensation, no weakness, no focal findings identified.  Psychiatric: Appropriate mood and affect  Relevant Results     Results for orders placed or performed during the hospital encounter of 07/24/24 (from the past 24 hour(s))   CBC   Result Value Ref Range    WBC 14.2 (H) 4.4 - 11.3 x10*3/uL    nRBC 0.0 0.0 - 0.0 /100 WBCs    RBC 3.07 (L) 4.00 - 5.20 x10*6/uL    Hemoglobin 9.0 (L) 12.0 - 16.0 g/dL    Hematocrit 28.1 (L) 36.0 - 46.0 %    MCV 92 80 - 100 fL    MCH 29.3 26.0 - 34.0 pg    MCHC 32.0 32.0 - 36.0 g/dL    RDW 14.6 (H) 11.5 - 14.5 %    Platelets 240 150 - 450 x10*3/uL   Basic Metabolic Panel   Result Value Ref Range    Glucose 86 74 - 99 mg/dL    Sodium 129 (L) 136 - 145 mmol/L    Potassium  3.5 3.5 - 5.3 mmol/L    Chloride 96 (L) 98 - 107 mmol/L    Bicarbonate 28 21 - 32 mmol/L    Anion Gap 9 (L) 10 - 20 mmol/L    Urea Nitrogen 16 6 - 23 mg/dL    Creatinine 0.38 (L) 0.50 - 1.05 mg/dL    eGFR >90 >60 mL/min/1.73m*2    Calcium 7.8 (L) 8.6 - 10.3 mg/dL       Assessment/Plan    Bel Cruz is a 83 y.o. female with a past medical history of COPD, CHF chronic hypoxia lung cancer, MI, CVA, and pancreatic cancer presented to the emergency room from nursing home for a fall. Last known well prior to arrival was 2 hours. The patient is a hospice patient. Her daughter who is POA advised she did not want a workup.CT brain, cervical spine, chest abdomen pelvis shows no traumatic injury. Urine without infection. Lab work shows a hypokalemia. Malnutrition. Treated with 1 L normal saline and IV potassium Discussed again with daughter who agrees patient will likely need placement in a nursing home then can resume hospice.     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    Left-sided pneumonia  -Chest x-ray today which revealed left-sided consolidation.  Will continue on IV Rocephin and azithromycin.  -Strep pneumonia and Legionella antigens pending.  -Procalcitonin 42.46  -White blood cell count trending down 14.2 patient afebrile     Pancreatic malignancy  -Plan at this time is to transition back to SNF then resume hospice.  -Pain and anxiety medications available      Protein malnutrition  -Dietitian consult     Extensive colonic stool with fecal impaction  -Patient had a moderate bowel movement upon arrival to  room  -Continue aggressive bowel regimen     DVT prophylaxis SCDs and Lovenox     Disposition transition to SNF in the morning if medically stable      Malnutrition Diagnosis Status: New  Malnutrition Diagnosis: Severe malnutrition related to chronic disease or condition  As Evidenced by: significant weight loss >20% in 8 mo, severe muscle atrophy and fat store loss (see NFPE), intake predicted <50% estimated needs x >7 days  I agree with the dietitian's malnutrition diagnosis.         Елена Lorenzo, APRN-CNP

## 2024-07-27 LAB
ANION GAP SERPL CALC-SCNC: 9 MMOL/L (ref 10–20)
BUN SERPL-MCNC: 12 MG/DL (ref 6–23)
CALCIUM SERPL-MCNC: 7.7 MG/DL (ref 8.6–10.3)
CHLORIDE SERPL-SCNC: 96 MMOL/L (ref 98–107)
CO2 SERPL-SCNC: 28 MMOL/L (ref 21–32)
CREAT SERPL-MCNC: 0.39 MG/DL (ref 0.5–1.05)
EGFRCR SERPLBLD CKD-EPI 2021: >90 ML/MIN/1.73M*2
ERYTHROCYTE [DISTWIDTH] IN BLOOD BY AUTOMATED COUNT: 14.3 % (ref 11.5–14.5)
GLUCOSE SERPL-MCNC: 127 MG/DL (ref 74–99)
HCT VFR BLD AUTO: 27.4 % (ref 36–46)
HGB BLD-MCNC: 8.9 G/DL (ref 12–16)
MCH RBC QN AUTO: 30.2 PG (ref 26–34)
MCHC RBC AUTO-ENTMCNC: 32.5 G/DL (ref 32–36)
MCV RBC AUTO: 93 FL (ref 80–100)
NRBC BLD-RTO: 0 /100 WBCS (ref 0–0)
PLATELET # BLD AUTO: 224 X10*3/UL (ref 150–450)
POTASSIUM SERPL-SCNC: 3.6 MMOL/L (ref 3.5–5.3)
RBC # BLD AUTO: 2.95 X10*6/UL (ref 4–5.2)
SODIUM SERPL-SCNC: 129 MMOL/L (ref 136–145)
WBC # BLD AUTO: 9.5 X10*3/UL (ref 4.4–11.3)

## 2024-07-27 PROCEDURE — 2500000005 HC RX 250 GENERAL PHARMACY W/O HCPCS: Performed by: EMERGENCY MEDICINE

## 2024-07-27 PROCEDURE — 2500000004 HC RX 250 GENERAL PHARMACY W/ HCPCS (ALT 636 FOR OP/ED): Performed by: NURSE PRACTITIONER

## 2024-07-27 PROCEDURE — 94640 AIRWAY INHALATION TREATMENT: CPT

## 2024-07-27 PROCEDURE — 1100000001 HC PRIVATE ROOM DAILY

## 2024-07-27 PROCEDURE — 36415 COLL VENOUS BLD VENIPUNCTURE: CPT | Performed by: NURSE PRACTITIONER

## 2024-07-27 PROCEDURE — 2500000001 HC RX 250 WO HCPCS SELF ADMINISTERED DRUGS (ALT 637 FOR MEDICARE OP): Performed by: NURSE PRACTITIONER

## 2024-07-27 PROCEDURE — 2500000002 HC RX 250 W HCPCS SELF ADMINISTERED DRUGS (ALT 637 FOR MEDICARE OP, ALT 636 FOR OP/ED): Performed by: NURSE PRACTITIONER

## 2024-07-27 PROCEDURE — 94761 N-INVAS EAR/PLS OXIMETRY MLT: CPT

## 2024-07-27 PROCEDURE — 2500000005 HC RX 250 GENERAL PHARMACY W/O HCPCS: Performed by: NURSE PRACTITIONER

## 2024-07-27 PROCEDURE — 85027 COMPLETE CBC AUTOMATED: CPT | Performed by: NURSE PRACTITIONER

## 2024-07-27 PROCEDURE — 82310 ASSAY OF CALCIUM: CPT | Performed by: NURSE PRACTITIONER

## 2024-07-27 ASSESSMENT — COGNITIVE AND FUNCTIONAL STATUS - GENERAL
STANDING UP FROM CHAIR USING ARMS: A LOT
MOVING FROM LYING ON BACK TO SITTING ON SIDE OF FLAT BED WITH BEDRAILS: A LITTLE
DRESSING REGULAR UPPER BODY CLOTHING: A LITTLE
STANDING UP FROM CHAIR USING ARMS: A LITTLE
DRESSING REGULAR LOWER BODY CLOTHING: A LOT
TOILETING: A LOT
DAILY ACTIVITIY SCORE: 15
STANDING UP FROM CHAIR USING ARMS: A LOT
PERSONAL GROOMING: A LOT
HELP NEEDED FOR BATHING: A LOT
MOVING TO AND FROM BED TO CHAIR: A LITTLE
TURNING FROM BACK TO SIDE WHILE IN FLAT BAD: A LITTLE

## 2024-07-27 ASSESSMENT — PAIN SCALES - GENERAL
PAINLEVEL_OUTOF10: 0 - NO PAIN
PAINLEVEL_OUTOF10: 0 - NO PAIN

## 2024-07-27 NOTE — PROGRESS NOTES
07/27/24 1119   Discharge Planning   Expected Discharge Disposition SNF     ADOD tomorrow. Sent message to [facility] notifying of same and asked for confirmation that they can do a Sunday admission, awaiting response. Called Pt Assisted living waiver   yesterday leaving message to notify of SNF admission in [place], no call back as of yet. Called left VM requesting return call to daughter to see if she can transport as care team verbalizes possible issues with same in times past to transport that far.  CT will follow.    1155- [facility] confirms that they are able to take pt on Sunday for ADOD tomorrow. Requested contact number for weekend admissions. Awaiting response. CT will follow.   1200- weekend admission phone number- [phone]. Awaiting call back from daughter on transport. CT will follow.

## 2024-07-27 NOTE — PROGRESS NOTES
Bel Cruz is a 83 y.o. female on day 2 of admission presenting with Weakness generalized.      Subjective   Patient sitting up in chair eating lunch.  She is in no acute distress.  Denies cough shortness of breath or any other issues at this time.  SNF placement pending       Objective     Last Recorded Vitals  /70 (BP Location: Right arm, Patient Position: Lying)   Pulse 83   Temp 36.8 °C (98.2 °F) (Temporal)   Resp 18   Wt 45.4 kg (100 lb)   SpO2 97%   Intake/Output last 3 Shifts:    Intake/Output Summary (Last 24 hours) at 7/27/2024 1310  Last data filed at 7/27/2024 0900  Gross per 24 hour   Intake 480 ml   Output 540 ml   Net -60 ml       Admission Weight  Weight: 45.4 kg (100 lb) (07/24/24 0810)    Daily Weight  07/24/24 : 45.4 kg (100 lb)    Image Results  ECG 12 lead  Sinus rhythm with Premature supraventricular complexes  Nonspecific intraventricular block  Minimal voltage criteria for LVH, may be normal variant ( [place] product )  Cannot rule out Anterior infarct , age undetermined  Abnormal ECG  When compared with ECG of 01-FEB-2024 13:19,  QRS axis Shifted right  ST elevation now present in Anterior leads  Nonspecific T wave abnormality, worse in Lateral leads  QT has lengthened  See ED provider note for full interpretation and clinical correlation  Confirmed by Nimisha Alexandre ([id number]) on 7/25/2024 10:37:05 AM  XR chest 1 view  Narrative: Interpreted By:  Jeffery Aguilar,   STUDY:  XR CHEST 1 VIEW  7/25/2024 8:24 am      INDICATION:  Signs/Symptoms:cough sob      COMPARISON:  02/01/2024      ACCESSION NUMBER(S):  [id number]      ORDERING CLINICIAN:  DONNA SOLANO      TECHNIQUE:  A single AP portable radiograph of the chest was obtained.      FINDINGS:  Multiple cardiac monitoring leads are seen over the chest.  Airspace  consolidation is seen over the left upper lung, slightly increased in  extent from the prior study, and may represent scarring, atelectasis  and/or pneumonia. Left  basilar airspace consolidation is seen and may  represent small pleural effusion, atelectasis and/or pneumonia. No  pneumothorax is identified. The cardiac silhouette is within normal  limits for size.      Impression: Left-sided airspace consolidations, as above. Clinical correlation  and continued follow-up until clearing is recommended.      MACRO:  None.      Signed by: Jeffery Aguilar 7/25/2024 9:12 AM  Dictation workstation:   [id number]      Physical Exam  Appearance: Alert. cooperative, frail cachectic  Skin: Intact,  dry skin, no lesions, rash, petechiae or purpura.   Eyes: PERRLA, EOMs intact,  Conjunctiva pink with no redness or exudates.   HENT: Normocephalic, atraumatic. Nares patent. No intraoral lesions.  Dry mucous membranes.  Neck: Supple, without meningismus. Trachea at midline. No lymphadenopathy.  C-collar in place.  Pulmonary: Coarse breath sounds bilateral excursion.   Cardiac: Regular rate and rhythm, no rubs, murmurs, or gallops.   Abdomen: Abdomen is soft, nontender, and nondistended.  No palpable organomegaly.  No rebound or guarding.    Musculoskeletal: Full range of motion.  Pulses full and equal. No cyanosis, clubbing, or edema.  Neurological:  Cranial nerves are grossly intact, grossly normal sensation, no weakness, no focal findings identified.  Psychiatric: Appropriate mood and affect  Relevant Results       Results for orders placed or performed during the hospital encounter of 07/24/24 (from the past 24 hour(s))   CBC   Result Value Ref Range    WBC 9.5 4.4 - 11.3 x10*3/uL    nRBC 0.0 0.0 - 0.0 /100 WBCs    RBC 2.95 (L) 4.00 - 5.20 x10*6/uL    Hemoglobin 8.9 (L) 12.0 - 16.0 g/dL    Hematocrit 27.4 (L) 36.0 - 46.0 %    MCV 93 80 - 100 fL    MCH 30.2 26.0 - 34.0 pg    MCHC 32.5 32.0 - 36.0 g/dL    RDW 14.3 11.5 - 14.5 %    Platelets 224 150 - 450 x10*3/uL   Basic Metabolic Panel   Result Value Ref Range    Glucose 127 (H) 74 - 99 mg/dL    Sodium 129 (L) 136 - 145 mmol/L    Potassium  3.6 3.5 - 5.3 mmol/L    Chloride 96 (L) 98 - 107 mmol/L    Bicarbonate 28 21 - 32 mmol/L    Anion Gap 9 (L) 10 - 20 mmol/L    Urea Nitrogen 12 6 - 23 mg/dL    Creatinine 0.39 (L) 0.50 - 1.05 mg/dL    eGFR >90 >60 mL/min/1.73m*2    Calcium 7.7 (L) 8.6 - 10.3 mg/dL         Assessment/Plan       Bel Cruz is a 83 y.o. female with a past medical history of COPD, CHF chronic hypoxia lung cancer, MI, CVA, and pancreatic cancer presented to the emergency room from nursing home for a fall. Last known well prior to arrival was 2 hours. The patient is a hospice patient. Her daughter who is POA advised she did not want a workup.CT brain, cervical spine, chest abdomen pelvis shows no traumatic injury. Urine without infection. Lab work shows a hypokalemia. Malnutrition. Treated with 1 L normal saline and IV potassium Discussed again with daughter who agrees patient will likely need placement in a nursing home then can resume hospice.     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     Left-sided pneumonia  -Chest x-ray today which revealed left-sided consolidation.  Will continue on IV Rocephin and azithromycin.  DC Zithromycin tomorrow and convert Rocephin to Augmentin for discharge.  -Strep pneumonia and Legionella antigens negative  -Procalcitonin 42.46  -White blood cell count normal patient afebrile     Pancreatic malignancy  -Plan at this time is to transition back to SNF then resume hospice.  -Pain and anxiety medications available      Protein malnutrition  -Dietitian consult     Extensive colonic stool with fecal  impaction  -Patient had a moderate bowel movement upon arrival to room  -Continue aggressive bowel regimen     DVT prophylaxis SCDs and Lovenox     Disposition transition to SNF in the morning if medically stable        Malnutrition Diagnosis Status: New  Malnutrition Diagnosis: Severe malnutrition related to chronic disease or condition  As Evidenced by: significant weight loss >20% in 8 mo, severe muscle atrophy and fat store loss (see NFPE), intake predicted <50% estimated needs x >7 days  I agree with the dietitian's malnutrition diagnosis.           Елена Lorenzo, APRN-CNP

## 2024-07-27 NOTE — CARE PLAN
Problem: Skin  Goal: Decreased wound size/increased tissue granulation at next dressing change  7/27/2024 0057 by Corrine David RN  Outcome: Progressing  7/26/2024 2028 by Corrine David RN  Outcome: Progressing  Goal: Participates in plan/prevention/treatment measures  7/27/2024 0057 by Corrine David RN  Outcome: Progressing  7/26/2024 2028 by Corrine David RN  Outcome: Progressing  Goal: Prevent/manage excess moisture  7/27/2024 0057 by Corrine David RN  Outcome: Progressing  7/26/2024 2028 by Corrine David RN  Outcome: Progressing  Goal: Prevent/minimize sheer/friction injuries  7/27/2024 0057 by Corrine David RN  Outcome: Progressing  Flowsheets (Taken 7/27/2024 0057)  Prevent/minimize sheer/friction injuries: Use pull sheet  7/26/2024 2028 by Corrine David RN  Outcome: Progressing  Goal: Promote/optimize nutrition  7/27/2024 0057 by Corrine David RN  Outcome: Progressing  7/26/2024 2028 by Corrine David RN  Outcome: Progressing  Goal: Promote skin healing  7/27/2024 0057 by Corrine David RN  Outcome: Progressing  7/26/2024 2028 by Corrine David RN  Outcome: Progressing     Problem: Pain - Adult  Goal: Verbalizes/displays adequate comfort level or baseline comfort level  7/27/2024 0057 by Corrine David RN  Outcome: Progressing  7/26/2024 2028 by Corrine David RN  Outcome: Progressing     Problem: Safety - Adult  Goal: Free from fall injury  7/27/2024 0057 by Corrine David RN  Outcome: Progressing  7/26/2024 2028 by Corrine David RN  Outcome: Progressing     Problem: Discharge Planning  Goal: Discharge to home or other facility with appropriate resources  7/27/2024 0057 by Corrine David RN  Outcome: Progressing  7/26/2024 2028 by Corrine David RN  Outcome: Progressing     Problem: Chronic Conditions and Co-morbidities  Goal: Patient's chronic conditions and co-morbidity symptoms are monitored and maintained or improved  7/27/2024 0057 by Corrine David RN  Outcome: Progressing  7/26/2024 2028 by Corrine David RN  Outcome:  Progressing     Problem: Fall/Injury  Goal: Not fall by end of shift  7/27/2024 0057 by Corrine David RN  Outcome: Progressing  7/26/2024 2028 by Corrine David RN  Outcome: Progressing  Goal: Be free from injury by end of the shift  7/27/2024 0057 by Corrine David RN  Outcome: Progressing  7/26/2024 2028 by Corrine David RN  Outcome: Progressing  Goal: Verbalize understanding of personal risk factors for fall in the hospital  7/27/2024 0057 by Corrine David RN  Outcome: Progressing  7/26/2024 2028 by Corrine David RN  Outcome: Progressing  Goal: Verbalize understanding of risk factor reduction measures to prevent injury from fall in the home  7/27/2024 0057 by Corrine David RN  Outcome: Progressing  7/26/2024 2028 by Corrine David RN  Outcome: Progressing  Goal: Use assistive devices by end of the shift  7/27/2024 0057 by Corrine David RN  Outcome: Progressing  7/26/2024 2028 by Corrine David RN  Outcome: Progressing  Goal: Pace activities to prevent fatigue by end of the shift  7/27/2024 0057 by Corrine David RN  Outcome: Progressing  7/26/2024 2028 by Corrine David RN  Outcome: Progressing     Problem: Nutrition  Goal: Oral intake greater than 50%  7/27/2024 0057 by Corrine David RN  Outcome: Progressing  7/26/2024 2028 by Corrine David RN  Outcome: Progressing  Goal: Consume prescribed supplement  7/27/2024 0057 by Corrine David RN  Outcome: Progressing  7/26/2024 2028 by Corrine David RN  Outcome: Progressing  Goal: Adequate PO fluid intake  7/27/2024 0057 by Corrine David RN  Outcome: Progressing  7/26/2024 2028 by Corrine David RN  Outcome: Progressing  Goal: BG  mg/dL  7/27/2024 0057 by Corrine David RN  Outcome: Progressing  7/26/2024 2028 by Corrine David RN  Outcome: Progressing  Goal: Lab values WNL  7/27/2024 0057 by Corrine David RN  Outcome: Progressing  7/26/2024 2028 by Corrine Berlin, RN  Outcome: Progressing  Goal: Maintain stable weight  7/27/2024 0057 by Corrine David RN  Outcome: Progressing  7/26/2024 2028 by  Corrine David, RN  Outcome: Progressing   The patient's goals for the shift include use the call light    The clinical goals for the shift include pain management

## 2024-07-27 NOTE — CARE PLAN
Problem: Skin  Goal: Decreased wound size/increased tissue granulation at next dressing change  Outcome: Progressing  Goal: Participates in plan/prevention/treatment measures  Outcome: Progressing  Goal: Prevent/manage excess moisture  Outcome: Progressing  Goal: Prevent/minimize sheer/friction injuries  Outcome: Progressing  Goal: Promote/optimize nutrition  Outcome: Progressing  Goal: Promote skin healing  Outcome: Progressing     Problem: Pain - Adult  Goal: Verbalizes/displays adequate comfort level or baseline comfort level  Outcome: Progressing     Problem: Safety - Adult  Goal: Free from fall injury  Outcome: Progressing     Problem: Discharge Planning  Goal: Discharge to home or other facility with appropriate resources  Outcome: Progressing     Problem: Chronic Conditions and Co-morbidities  Goal: Patient's chronic conditions and co-morbidity symptoms are monitored and maintained or improved  Outcome: Progressing     Problem: Fall/Injury  Goal: Not fall by end of shift  Outcome: Progressing  Goal: Be free from injury by end of the shift  Outcome: Progressing  Goal: Verbalize understanding of personal risk factors for fall in the hospital  Outcome: Progressing  Goal: Verbalize understanding of risk factor reduction measures to prevent injury from fall in the home  Outcome: Progressing  Goal: Use assistive devices by end of the shift  Outcome: Progressing  Goal: Pace activities to prevent fatigue by end of the shift  Outcome: Progressing     Problem: Nutrition  Goal: Oral intake greater than 50%  Outcome: Progressing  Goal: Consume prescribed supplement  Outcome: Progressing  Goal: Adequate PO fluid intake  Outcome: Progressing  Goal: BG  mg/dL  Outcome: Progressing  Goal: Lab values WNL  Outcome: Progressing  Goal: Maintain stable weight  Outcome: Progressing   The patient's goals for the shift include use the call light    The clinical goals for the shift include pain management

## 2024-07-27 NOTE — CARE PLAN
The patient's goals for the shift include use the call light    The clinical goals for the shift include pain management    Problem: Skin  Goal: Decreased wound size/increased tissue granulation at next dressing change  Outcome: Progressing  Goal: Participates in plan/prevention/treatment measures  Outcome: Progressing  Goal: Prevent/manage excess moisture  Outcome: Progressing  Goal: Prevent/minimize sheer/friction injuries  Outcome: Progressing  Goal: Promote/optimize nutrition  Outcome: Progressing  Goal: Promote skin healing  Outcome: Progressing     Problem: Pain - Adult  Goal: Verbalizes/displays adequate comfort level or baseline comfort level  Outcome: Progressing     Problem: Chronic Conditions and Co-morbidities  Goal: Patient's chronic conditions and co-morbidity symptoms are monitored and maintained or improved  Outcome: Progressing     Problem: Discharge Planning  Goal: Discharge to home or other facility with appropriate resources  Outcome: Progressing     Problem: Nutrition  Goal: Oral intake greater than 50%  Outcome: Progressing  Goal: Consume prescribed supplement  Outcome: Progressing  Goal: Adequate PO fluid intake  Outcome: Progressing  Goal: BG  mg/dL  Outcome: Progressing  Goal: Lab values WNL  Outcome: Progressing  Goal: Maintain stable weight  Outcome: Progressing

## 2024-07-27 NOTE — CARE PLAN
The patient's goals for the shift include use the call light    The clinical goals for the shift include pain management    Problem: Skin  Goal: Decreased wound size/increased tissue granulation at next dressing change  7/27/2024 1555 by Elina Dominguez RN  Outcome: Progressing  Flowsheets (Taken 7/27/2024 1555)  Decreased wound size/increased tissue granulation at next dressing change:   Promote sleep for wound healing   Protective dressings over bony prominences  7/27/2024 1555 by Elina Dominguez RN  Outcome: Progressing  Flowsheets (Taken 7/27/2024 1555)  Decreased wound size/increased tissue granulation at next dressing change:   Promote sleep for wound healing   Protective dressings over bony prominences  Goal: Participates in plan/prevention/treatment measures  7/27/2024 1555 by Elina Dominguez RN  Outcome: Progressing  Flowsheets (Taken 7/27/2024 1555)  Participates in plan/prevention/treatment measures:   Elevate heels   Discuss with provider PT/OT consult   Increase activity/out of bed for meals  7/27/2024 1555 by Elina Dominguez RN  Outcome: Progressing  Flowsheets (Taken 7/27/2024 1555)  Participates in plan/prevention/treatment measures:   Elevate heels   Discuss with provider PT/OT consult   Increase activity/out of bed for meals  Goal: Prevent/manage excess moisture  7/27/2024 1555 by Elina Dominguez RN  Outcome: Progressing  Flowsheets (Taken 7/27/2024 1555)  Prevent/manage excess moisture:   Moisturize dry skin   Monitor for/manage infection if present   Cleanse incontinence/protect with barrier cream  7/27/2024 1555 by Elina Dominguez RN  Outcome: Progressing  Flowsheets (Taken 7/27/2024 1555)  Prevent/manage excess moisture:   Moisturize dry skin   Monitor for/manage infection if present   Cleanse incontinence/protect with barrier cream  Goal: Prevent/minimize sheer/friction injuries  7/27/2024 1555 by Elina Dominguez RN  Outcome: Progressing  Flowsheets (Taken 7/27/2024 1555)  Prevent/minimize sheer/friction injuries:    Increase activity/out of bed for meals   Turn/reposition every 2 hours/use positioning/transfer devices  7/27/2024 1555 by Elina Dominguez RN  Outcome: Progressing  Flowsheets (Taken 7/27/2024 1555)  Prevent/minimize sheer/friction injuries:   Increase activity/out of bed for meals   Turn/reposition every 2 hours/use positioning/transfer devices  Goal: Promote/optimize nutrition  7/27/2024 1555 by Elina Dominguez RN  Outcome: Progressing  Flowsheets (Taken 7/27/2024 1555)  Promote/optimize nutrition:   Monitor/record intake including meals   Offer water/supplements/favorite foods   Consume > 50% meals/supplements  7/27/2024 1555 by Elina Dominguez RN  Outcome: Progressing  Flowsheets (Taken 7/27/2024 1555)  Promote/optimize nutrition:   Monitor/record intake including meals   Offer water/supplements/favorite foods   Consume > 50% meals/supplements  Goal: Promote skin healing  7/27/2024 1555 by Elina Dominguez RN  Outcome: Progressing  Flowsheets (Taken 7/27/2024 1555)  Promote skin healing:   Protective dressings over bony prominences   Assess skin/pad under line(s)/device(s)   Turn/reposition every 2 hours/use positioning/transfer devices  7/27/2024 1555 by Elina Dominguez RN  Outcome: Progressing  Flowsheets (Taken 7/27/2024 1555)  Promote skin healing:   Protective dressings over bony prominences   Assess skin/pad under line(s)/device(s)   Turn/reposition every 2 hours/use positioning/transfer devices     Problem: Pain - Adult  Goal: Verbalizes/displays adequate comfort level or baseline comfort level  7/27/2024 1555 by Elina Dominguez RN  Outcome: Progressing  7/27/2024 1555 by Elina Dominguez RN  Outcome: Progressing     Problem: Safety - Adult  Goal: Free from fall injury  7/27/2024 1555 by Elina Dominguez RN  Outcome: Progressing  7/27/2024 1555 by Elina Dominguez RN  Outcome: Progressing     Problem: Discharge Planning  Goal: Discharge to home or other facility with appropriate resources  7/27/2024 1555 by Elina Dominguez RN  Outcome:  Progressing  7/27/2024 1555 by Elina Dominguez RN  Outcome: Progressing

## 2024-07-28 VITALS
TEMPERATURE: 98.4 F | OXYGEN SATURATION: 93 % | HEIGHT: 62 IN | HEART RATE: 75 BPM | DIASTOLIC BLOOD PRESSURE: 78 MMHG | WEIGHT: 100 LBS | RESPIRATION RATE: 16 BRPM | BODY MASS INDEX: 18.4 KG/M2 | SYSTOLIC BLOOD PRESSURE: 137 MMHG

## 2024-07-28 PROBLEM — R53.1 WEAKNESS: Status: RESOLVED | Noted: 2024-07-25 | Resolved: 2024-07-28

## 2024-07-28 PROCEDURE — 94640 AIRWAY INHALATION TREATMENT: CPT

## 2024-07-28 PROCEDURE — 94761 N-INVAS EAR/PLS OXIMETRY MLT: CPT

## 2024-07-28 PROCEDURE — 2500000002 HC RX 250 W HCPCS SELF ADMINISTERED DRUGS (ALT 637 FOR MEDICARE OP, ALT 636 FOR OP/ED): Performed by: NURSE PRACTITIONER

## 2024-07-28 PROCEDURE — 2500000005 HC RX 250 GENERAL PHARMACY W/O HCPCS: Performed by: EMERGENCY MEDICINE

## 2024-07-28 PROCEDURE — 2500000001 HC RX 250 WO HCPCS SELF ADMINISTERED DRUGS (ALT 637 FOR MEDICARE OP): Performed by: NURSE PRACTITIONER

## 2024-07-28 PROCEDURE — 2500000004 HC RX 250 GENERAL PHARMACY W/ HCPCS (ALT 636 FOR OP/ED): Performed by: NURSE PRACTITIONER

## 2024-07-28 RX ORDER — AMOXICILLIN AND CLAVULANATE POTASSIUM 875; 125 MG/1; MG/1
1 TABLET, FILM COATED ORAL 2 TIMES DAILY
Start: 2024-07-28 | End: 2024-08-04

## 2024-07-28 ASSESSMENT — PAIN SCALES - GENERAL
PAINLEVEL_OUTOF10: 0 - NO PAIN
PAINLEVEL_OUTOF10: 0 - NO PAIN

## 2024-07-28 ASSESSMENT — PAIN - FUNCTIONAL ASSESSMENT
PAIN_FUNCTIONAL_ASSESSMENT: 0-10
PAIN_FUNCTIONAL_ASSESSMENT: 0-10

## 2024-07-28 NOTE — DISCHARGE SUMMARY
Discharge Diagnosis  Weakness generalized    Issues Requiring Follow-Up  COPD  CHF with chronic hypoxia   Lung cancer   Pancreatic cancer    Discharge Meds     Your medication list        START taking these medications        Instructions Last Dose Given Next Dose Due   amoxicillin-pot clavulanate 875-125 mg tablet  Commonly known as: Augmentin      Take 1 tablet by mouth 2 times a day for 7 days.              CHANGE how you take these medications        Instructions Last Dose Given Next Dose Due   bisacodyl 5 mg EC tablet  Commonly known as: Dulcolax  What changed: Another medication with the same name was removed. Continue taking this medication, and follow the directions you see here.                  CONTINUE taking these medications        Instructions Last Dose Given Next Dose Due   alum-mag hydroxide-simeth 400-400-40 mg/5 mL suspension  Commonly known as: Maalox MAX           buPROPion  mg 24 hr tablet  Commonly known as: Wellbutrin XL           guaiFENesin 600 mg 12 hr tablet  Commonly known as: Mucinex           haloperidol 1 mg tablet  Commonly known as: Haldol           ipratropium-albuteroL 0.5-2.5 mg/3 mL nebulizer solution  Commonly known as: Duo-Neb           LORazepam 0.5 mg tablet  Commonly known as: Ativan           magnesium hydroxide 400 mg/5 mL suspension  Commonly known as: Milk of Magnesia           mirtazapine 15 mg tablet  Commonly known as: Remeron           morphine 15 mg tablet  Commonly known as: MSIR           morphine 20 mg/5 mL (4 mg/mL) solution           nicotine 14 mg/24 hr patch  Commonly known as: Nicoderm CQ           omeprazole 20 mg DR capsule  Commonly known as: PriLOSEC           ondansetron 4 mg tablet  Commonly known as: Zofran           polyethylene glycol 17 gram packet  Commonly known as: Glycolax, Miralax           predniSONE 5 mg tablet  Commonly known as: Deltasone           SALINE NASAL NASL           Senna Plus 8.6-50 mg tablet  Generic drug:  sennosides-docusate sodium                  STOP taking these medications      ALPRAZolam 0.25 mg tablet  Commonly known as: Xanax                  Where to Get Your Medications        Information about where to get these medications is not yet available    Ask your nurse or doctor about these medications  amoxicillin-pot clavulanate 875-125 mg tablet         Test Results Pending At Discharge  Pending Labs       No current pending labs.            Hospital Course   Bel Cruz is a 83 y.o. female with a past medical history of COPD, CHF chronic hypoxia lung cancer, MI, CVA, and pancreatic cancer presented to the emergency room from nursing home for a fall.  Last known well prior to arrival was 2 hours.  The patient is a hospice patient.  Her daughter who is POA advised she did not want a workup.CT brain, cervical spine, chest abdomen pelvis shows no traumatic injury. Urine without infection. Lab work shows a hypokalemia. Malnutrition. Treated with 1 L normal saline and IV potassium.  Patient's daughter revoked hospice.  Discussed again with daughter who agrees patient will likely need placement after discharge.  Patient to follow-up and increase cough and worsening lung sounds.  Repeat chest x-ray showed left upper lobe pneumonia.  Patient was started on IV Rocephin and Zithromax.  Her breath sounds improved and she remained hemodynamically stable.  Upon discharge she will be transitioned to Augmentin p.o. x 1 week.    PT OT were consulted.  AM-PAC score 16.  Patient requires moderate assist and supervision with ambulation.  She is very hard of hearing and at times impulsive.  She needs cueing to wait for assistance before getting up..  Patient will be discharged to SNF today in stable condition.      Pertinent Physical Exam At Time of Discharge  Physical Exam  Appearance: Alert. cooperative, frail cachectic  Skin: Intact,  dry skin, no lesions, rash, petechiae or purpura.   Eyes: PERRLA, EOMs intact,   Conjunctiva pink with no redness or exudates.   HENT: Normocephalic, atraumatic. Nares patent. No intraoral lesions.  Dry mucous membranes.  Very hard of hearing  Neck: Supple, without meningismus. Trachea at midline. No lymphadenopathy.  C-collar in place.  Pulmonary: Coarse breath sounds bilateral excursion.   Cardiac: Regular rate and rhythm, no rubs, murmurs, or gallops.   Abdomen: Abdomen is soft, nontender, and nondistended.  No palpable organomegaly.  No rebound or guarding.    Musculoskeletal: Full range of motion.  Pulses full and equal. No cyanosis, clubbing, or edema.  Neurological:  Cranial nerves are grossly intact, grossly normal sensation, no weakness, no focal findings identified.  Psychiatric: Appropriate mood and affect  Outpatient Follow-Up  May need referral back to hospice care      Елена Lorenzo, APRN-CNP

## 2024-07-28 NOTE — PROGRESS NOTES
Patient is ready for discharge today.SW did call the SNF to let them know time of  at 11:00 am. Also called and spoke with the daughter and also informed her that there may be a bill as not sure what the insurance will cover. She voiced understanding and said that this is ok. Did ask [facility] to send orders via care port. Facility would like them sent with the patient. Will also ask nurse to do report. Patient to [facility] today for [facility] [place], [place].      KIA Perez

## 2024-07-28 NOTE — CARE PLAN
Problem: Skin  Goal: Decreased wound size/increased tissue granulation at next dressing change  Outcome: Progressing  Flowsheets (Taken 7/28/2024 0135)  Decreased wound size/increased tissue granulation at next dressing change: Protective dressings over bony prominences  Goal: Prevent/minimize sheer/friction injuries  Outcome: Progressing

## 2024-07-28 NOTE — DISCHARGE INSTRUCTIONS
"Weakness    The Basics  Written by the doctors and editors at [facility]  What is weakness? -- \"Weakness\" usually refers to a lack of muscle strength. Sometimes, people have weakness in just 1 part of the body, like an arm or a leg. Sometimes, the weakness extends to multiple parts of the body.  \"Generalized\" weakness is when you feel weak all over. Sometimes, people feel weak when they are tired or don't have enough energy, but their muscle strength is not affected.  What can cause weakness? -- Different things can cause muscle weakness.  Less serious causes might include:  ? Working your muscles too hard  ? Temporary nerve compression, or when your arm or leg \"falls asleep\"  More serious causes of muscle weakness might include:  ? Diseases that affect muscles or nerves - Examples include muscular dystrophy, Guillain-Charlotte syndrome, myasthenia, amyotrophic lateral sclerosis, and diabetes.  ? Brain or spinal cord problems - Examples include stroke, multiple sclerosis, tumor, and infections.  Causes of generalized weakness are more varied. They can include stress, poor sleep, anemia, and certain medicines or medical conditions.  Will I need tests? -- Maybe. Your doctor or nurse will ask about your symptoms and do an exam. They will check your muscle strength to find out if you have:  ? Muscle tenderness  ? Weakness in just 1 part of your body, 1 side of your body, or throughout your whole body  ? The same muscle strength on both sides of your body  ? Weakness in a specific pattern, such as worse after activity but gets better with rest  Your doctor or nurse might also do:  ? Blood or urine tests  ? Nerve conduction studies - These can show whether the nerves are carrying electrical signals the right way. In people with some causes of weakness, signals can be slow or weak.  ? Electromyography (\"EMG\") - This test can show whether the muscles are responding the right way to electrical signals.  ? MRI scan of your brain or " spine - An MRI is an imaging test that creates pictures of the inside of your body.  ? Muscle biopsy - The doctor takes a small sample of the weak muscle. Then, another doctor looks at the sample under a microscope.  How is weakness treated? -- Treatment for weakness depends on what is causing it. If the weakness is caused by other more serious conditions or problems, the doctor or nurse will treat that condition. Physical therapy might be helpful in some cases.  Is there anything I can do on my own to feel better? -- It might help to:  ? Avoid alcohol and recreational drugs.  ? Try to get regular physical activity. Even gentle forms of exercise, like walking, are good for your health.  ? Try to get at least 8 hours of sleep every night. If you have trouble sleeping, you can do things to improve your sleep habits. For example, you can:  ? Avoid drinking alcohol or caffeine in the late afternoon or evening.  ? Try to go to bed and wake up at the same time every day.  ? Limit your naps during the day, and don't nap for more than 30 minutes at a time.  ? Try to eat a healthy diet that includes plenty of fruits, vegetables, whole grains, and low-fat dairy products. This can help improve your overall health.  ? Try to avoid falls at home. If you were given a cane or walker, follow the instructions for using it.  What follow-up care do I need? -- Your doctor or nurse will tell you if you need to make a follow-up appointment. If so, make sure that you know when and where to go.  When should I call the doctor? -- Call for emergency help right away (in the [place] and [place], call [id number]) if:  ? You have signs of a stroke like:  ? Sudden numbness or weakness of the face, arm, or leg, especially on 1 side of the body  ? Sudden confusion, or trouble speaking or understanding  ? Sudden trouble seeing in 1 or both eyes  ? Sudden trouble walking, dizziness, or loss of balance or coordination  ? Sudden severe headache with no known  cause  ? You have other signs of severe illness, or your weakness becomes severe, such as:  ? You have a fever of 102.2°F (39° C) or higher, shaking chills, or sweats.  ? You have trouble walking or lifting things, or feel so weak that you cannot get out of bed.  ? You develop severe trouble breathing or severe chest discomfort.  ? You become unresponsive.  Call for advice if:  ? You feel like your heart is beating very fast or slow.  ? You become dizzy or weak when standing up from a lying or sitting position.  ? Your weakness gets worse, or you develop new problems such as slurred speech or double vision.  ? You have problems eating or sleeping.  ? You are having trouble functioning at work, at home, or in school.